# Patient Record
Sex: MALE | Race: WHITE | NOT HISPANIC OR LATINO | Employment: OTHER | RURAL
[De-identification: names, ages, dates, MRNs, and addresses within clinical notes are randomized per-mention and may not be internally consistent; named-entity substitution may affect disease eponyms.]

---

## 2017-02-24 DIAGNOSIS — I42.0 DILATED CARDIOMYOPATHY (HCC): ICD-10-CM

## 2017-03-01 DIAGNOSIS — I42.0 DILATED CARDIOMYOPATHY (HCC): ICD-10-CM

## 2017-03-01 RX ORDER — CARVEDILOL 25 MG/1
25 TABLET ORAL 2 TIMES DAILY
Qty: 60 TAB | Refills: 6 | Status: CANCELLED | OUTPATIENT
Start: 2017-03-01

## 2017-03-03 DIAGNOSIS — I42.0 DILATED CARDIOMYOPATHY (HCC): ICD-10-CM

## 2017-03-03 RX ORDER — CARVEDILOL 25 MG/1
25 TABLET ORAL 2 TIMES DAILY
Qty: 60 TAB | Refills: 11 | Status: SHIPPED | OUTPATIENT
Start: 2017-03-03 | End: 2018-03-15 | Stop reason: SDUPTHER

## 2017-03-07 DIAGNOSIS — I42.0 DILATED CARDIOMYOPATHY (HCC): ICD-10-CM

## 2017-03-07 RX ORDER — FUROSEMIDE 40 MG/1
40 TABLET ORAL DAILY
Qty: 90 TAB | Refills: 3 | Status: SHIPPED | OUTPATIENT
Start: 2017-03-07 | End: 2018-03-15 | Stop reason: SDUPTHER

## 2017-04-04 ENCOUNTER — NON-PROVIDER VISIT (OUTPATIENT)
Dept: CARDIOLOGY | Facility: CLINIC | Age: 57
End: 2017-04-04
Payer: MEDICARE

## 2017-04-04 DIAGNOSIS — I10 ESSENTIAL HYPERTENSION: ICD-10-CM

## 2017-04-05 LAB
LV EJECT FRACT  99904: 50
LV EJECT FRACT MOD 2C 99903: 47.82
LV EJECT FRACT MOD 4C 99902: 48.73
LV EJECT FRACT MOD BP 99901: 54.73

## 2017-04-06 DIAGNOSIS — I42.0 DILATED CARDIOMYOPATHY (HCC): ICD-10-CM

## 2017-04-07 ENCOUNTER — TELEPHONE (OUTPATIENT)
Dept: CARDIOLOGY | Facility: MEDICAL CENTER | Age: 57
End: 2017-04-07

## 2017-04-07 NOTE — TELEPHONE ENCOUNTER
Echo read by Dr. Zacarias.  Patient's f/u visit in June:    CONCLUSIONS  Mildly reduced left ventricular systolic function.  Left ventricular ejection fraction is visually estimated to be 50%.  Mid and basal septal hypokinesis.  Grade I diastolic dysfunction.  Normal right ventricular size and systolic function.  No significant valve disease or flow abnormalities.   Compared to the images of the prior study done 4/5/2016, there has been   an improvement in the estimated ejection fraction previously 40%.

## 2017-06-20 ENCOUNTER — OFFICE VISIT (OUTPATIENT)
Dept: CARDIOLOGY | Facility: CLINIC | Age: 57
End: 2017-06-20
Payer: MEDICARE

## 2017-06-20 VITALS
SYSTOLIC BLOOD PRESSURE: 140 MMHG | BODY MASS INDEX: 31.23 KG/M2 | HEIGHT: 67 IN | HEART RATE: 70 BPM | DIASTOLIC BLOOD PRESSURE: 80 MMHG | WEIGHT: 199 LBS

## 2017-06-20 DIAGNOSIS — I25.10 ATHEROSCLEROSIS OF NATIVE CORONARY ARTERY OF NATIVE HEART WITHOUT ANGINA PECTORIS: ICD-10-CM

## 2017-06-20 DIAGNOSIS — I42.0 DILATED CARDIOMYOPATHY (HCC): ICD-10-CM

## 2017-06-20 DIAGNOSIS — I10 ESSENTIAL HYPERTENSION, BENIGN: ICD-10-CM

## 2017-06-20 PROCEDURE — 99213 OFFICE O/P EST LOW 20 MIN: CPT | Performed by: INTERNAL MEDICINE

## 2017-06-20 ASSESSMENT — ENCOUNTER SYMPTOMS
COUGH: 0
ORTHOPNEA: 0
PND: 0
FALLS: 0
BRUISES/BLEEDS EASILY: 0
BACK PAIN: 1
WHEEZING: 0

## 2017-06-20 NOTE — PROGRESS NOTES
Subjective:   Everardo Last is a 57 y.o. male who presents today for follow-up of his coronary disease and left ventricular dysfunction  No angina, palpitation or dyspnea.   No syncope nor near syncope.   Past Medical History   Diagnosis Date   • Dilated cardiomyopathy (CMS-[facility]) April 2015     Echocardiogram with severely dilated LV. LVEF 20-25%. Global hypokinesis. Mild MR.  Coronary angiogram with 40% stenosis of LAD. Echo 8/4/2015 EF 35-45%, Echo 4/4/2017 EF 50%    • Hypertension    • Diabetes mellitus (CMS-[facility])    • Back pain    • Anxiety    • Depression      Past Surgical History   Procedure Laterality Date   • Eye surgery     • Other orthopedic surgery       Left ankle surgery   • Appendectomy       Family History   Problem Relation Age of Onset   • Heart Attack Mother    • Heart Disease Father    • Stroke Father      History   Smoking status   • Former Smoker   • Types: Cigarettes   • Quit date: 04/29/1994   Smokeless tobacco   • Never Used     Allergies   Allergen Reactions   • Codeine Rash     Patient states he gets rash on his arm     • Keflex Itching     Patient states he begins to itch all over      Outpatient Encounter Prescriptions as of 6/20/2017   Medication Sig Dispense Refill   • furosemide (LASIX) 40 MG Tab Take 1 Tab by mouth every day. 90 Tab 3   • carvedilol (COREG) 25 MG Tab Take 1 Tab by mouth 2 Times a Day. 60 Tab 11   • hydrocodone-acetaminophen (NORCO) 5-325 MG TABS per tablet Take 1-2 Tabs by mouth 1 time daily as needed.     • lisinopril (PRINIVIL) 10 MG TABS Take 1 Tab by mouth 2 Times a Day. 60 Tab 6   • spironolactone (ALDACTONE) 25 MG TABS Take 1 Tab by mouth every day. 30 Tab 6   • glipiZIDE (GLUCOTROL) 5 MG TABS Take 1 Tab by mouth every day. 30 Tab 6   • ferrous gluconate (FERGON) 324 (38 FE) MG TABS Take 1 Tab by mouth every morning with breakfast. 30 Tab 6   • aspirin (ASA) 81 MG CHEW chewable tablet Take 81 mg by mouth every day.       No facility-administered encounter  "medications on file as of 6/20/2017.     Review of Systems   Respiratory: Negative for cough and wheezing.    Cardiovascular: Negative for orthopnea, leg swelling and PND.   Musculoskeletal: Positive for back pain. Negative for falls.   Endo/Heme/Allergies: Does not bruise/bleed easily.        Objective:   /80 mmHg  Pulse 70  Ht 1.702 m (5' 7\")  Wt 90.266 kg (199 lb)  BMI 31.16 kg/m2    Physical Exam   Constitutional: He is oriented to person, place, and time. He appears well-developed and well-nourished. No distress.   Eyes: Conjunctivae are normal.   Neck: No JVD present.   Cardiovascular: Normal rate, regular rhythm, normal heart sounds and intact distal pulses.  Exam reveals no gallop and no friction rub.    No murmur heard.  Pulmonary/Chest: Effort normal and breath sounds normal.   Musculoskeletal: He exhibits no edema.   Neurological: He is alert and oriented to person, place, and time.   Skin: Skin is warm and dry. He is not diaphoretic.   Psychiatric: He has a normal mood and affect.       Assessment:     1. Atherosclerosis of native coronary artery of native heart without angina pectoris     2. Dilated cardiomyopathy (CMS-HCC)     3. Essential hypertension, benign       The above assessed cardiovascular problems are clinically stable.    Medical Decision Making:  Today's Assessment / Status / Plan:     Continue the current cardiovascular regimen.  Continue primary follow up with  Angelica Kay.   Cardiology follow up in  6 months and  sooner if needed for any change.   Lab before next month and call  (see scanned requisition).  Use of the emergency medical system reviewed.     "

## 2017-09-19 ENCOUNTER — OFFICE VISIT (OUTPATIENT)
Dept: CARDIOLOGY | Facility: CLINIC | Age: 57
End: 2017-09-19
Payer: MEDICARE

## 2017-09-19 VITALS
DIASTOLIC BLOOD PRESSURE: 80 MMHG | HEART RATE: 70 BPM | HEIGHT: 67 IN | WEIGHT: 201 LBS | SYSTOLIC BLOOD PRESSURE: 130 MMHG | BODY MASS INDEX: 31.55 KG/M2

## 2017-09-19 DIAGNOSIS — I42.0 DILATED CARDIOMYOPATHY (HCC): ICD-10-CM

## 2017-09-19 DIAGNOSIS — I10 ESSENTIAL HYPERTENSION, BENIGN: ICD-10-CM

## 2017-09-19 DIAGNOSIS — I25.10 ATHEROSCLEROSIS OF NATIVE CORONARY ARTERY OF NATIVE HEART WITHOUT ANGINA PECTORIS: ICD-10-CM

## 2017-09-19 PROCEDURE — 99213 OFFICE O/P EST LOW 20 MIN: CPT | Performed by: INTERNAL MEDICINE

## 2017-09-19 ASSESSMENT — ENCOUNTER SYMPTOMS
MYALGIAS: 0
PND: 0
COUGH: 0
BACK PAIN: 1
FALLS: 0
WHEEZING: 0
ORTHOPNEA: 0

## 2017-09-19 NOTE — LETTER
[facility]   [address] [place] [address]  Phone: [phone]  Fax:                Everardo Last  1960    Encounter Date: 9/19/2017    Tyler Thapa M.D.          PROGRESS NOTE:  Subjective:   Everardo Last is a 57 y.o. male who presents today for follow-up of cardiomyopathy. He has done very well with adjustment of his medicines by Echo To and improved control of his blood pressure. He has had no congestive symptoms.  He has had no symptoms to suggest angina.    Past Medical History:   Diagnosis Date   • Dilated cardiomyopathy (CMS-HCC) April 2015    Echocardiogram with severely dilated LV. LVEF 20-25%. Global hypokinesis. Mild MR.  Coronary angiogram with 40% stenosis of LAD. Echo 8/4/2015 EF 35-45%, Echo 4/4/2017 EF 50%    • Anxiety    • Back pain    • Depression    • Diabetes mellitus (CMS-HCC)    • Hypertension      Past Surgical History:   Procedure Laterality Date   • APPENDECTOMY     • EYE SURGERY     • OTHER ORTHOPEDIC SURGERY      Left ankle surgery     Family History   Problem Relation Age of Onset   • Heart Attack Mother    • Heart Disease Father    • Stroke Father      History   Smoking Status   • Former Smoker   • Types: Cigarettes   • Quit date: 4/29/1994   Smokeless Tobacco   • Never Used     Allergies   Allergen Reactions   • Codeine Rash     Patient states he gets rash on his arm     • Keflex Itching     Patient states he begins to itch all over      Outpatient Encounter Prescriptions as of 9/19/2017   Medication Sig Dispense Refill   • furosemide (LASIX) 40 MG Tab Take 1 Tab by mouth every day. 90 Tab 3   • carvedilol (COREG) 25 MG Tab Take 1 Tab by mouth 2 Times a Day. 60 Tab 11   • hydrocodone-acetaminophen (NORCO) 5-325 MG TABS per tablet Take 1-2 Tabs by mouth 1 time daily as needed.     • lisinopril (PRINIVIL) 10 MG TABS Take 1 Tab by mouth 2 Times a Day. 60 Tab 6   • spironolactone (ALDACTONE) 25  "MG TABS Take 1 Tab by mouth every day. 30 Tab 6   • glipiZIDE (GLUCOTROL) 5 MG TABS Take 1 Tab by mouth every day. 30 Tab 6   • ferrous gluconate (FERGON) 324 (38 FE) MG TABS Take 1 Tab by mouth every morning with breakfast. 30 Tab 6   • aspirin (ASA) 81 MG CHEW chewable tablet Take 81 mg by mouth every day.       No facility-administered encounter medications on file as of 9/19/2017.      Review of Systems   Respiratory: Negative for cough and wheezing.    Cardiovascular: Negative for orthopnea and PND.   Musculoskeletal: Positive for back pain. Negative for falls and myalgias.        Objective:   /80   Pulse 70   Ht 1.702 m (5' 7\")   Wt 91.2 kg (201 lb)   BMI 31.48 kg/m²      Physical Exam    Assessment:     1. Dilated cardiomyopathy (CMS-HCC)     2. Atherosclerosis of native coronary artery of native heart without angina pectoris     3. Essential hypertension, benign       Cardiac status has been clinically stable and his ejection fraction has substantially improved on medical therapy. His musculoskeletal back pain and presumed myalgias have been a barrier to statin therapy but his LDL has not been significantly elevated. He just had lab drawn yesterday and we are going to request that. It included chemistries and lipid panel.  Medical Decision Making:  Today's Assessment / Status / Plan:     Continue the current cardiovascular regimen.  Continue primary follow up with  Angelica Ariza.   Cardiology follow up in 6 months and  sooner if needed for any change.   Lab requested as noted above.  Use of the emergency medical system reviewed for any recurrent symptoms.       No Recipients              "

## 2017-09-19 NOTE — PROGRESS NOTES
Subjective:   Everardo Last is a 57 y.o. male who presents today for follow-up of cardiomyopathy. He has done very well with adjustment of his medicines by Angelica Ariza and improved control of his blood pressure. He has had no congestive symptoms.  He has had no symptoms to suggest angina.    Past Medical History:   Diagnosis Date   • Dilated cardiomyopathy (CMS-HCC) April 2015    Echocardiogram with severely dilated LV. LVEF 20-25%. Global hypokinesis. Mild MR.  Coronary angiogram with 40% stenosis of LAD. Echo 8/4/2015 EF 35-45%, Echo 4/4/2017 EF 50%    • Anxiety    • Back pain    • Depression    • Diabetes mellitus (CMS-HCC)    • Hypertension      Past Surgical History:   Procedure Laterality Date   • APPENDECTOMY     • EYE SURGERY     • OTHER ORTHOPEDIC SURGERY      Left ankle surgery     Family History   Problem Relation Age of Onset   • Heart Attack Mother    • Heart Disease Father    • Stroke Father      History   Smoking Status   • Former Smoker   • Types: Cigarettes   • Quit date: 4/29/1994   Smokeless Tobacco   • Never Used     Allergies   Allergen Reactions   • Codeine Rash     Patient states he gets rash on his arm     • Keflex Itching     Patient states he begins to itch all over      Outpatient Encounter Prescriptions as of 9/19/2017   Medication Sig Dispense Refill   • furosemide (LASIX) 40 MG Tab Take 1 Tab by mouth every day. 90 Tab 3   • carvedilol (COREG) 25 MG Tab Take 1 Tab by mouth 2 Times a Day. 60 Tab 11   • hydrocodone-acetaminophen (NORCO) 5-325 MG TABS per tablet Take 1-2 Tabs by mouth 1 time daily as needed.     • lisinopril (PRINIVIL) 10 MG TABS Take 1 Tab by mouth 2 Times a Day. 60 Tab 6   • spironolactone (ALDACTONE) 25 MG TABS Take 1 Tab by mouth every day. 30 Tab 6   • glipiZIDE (GLUCOTROL) 5 MG TABS Take 1 Tab by mouth every day. 30 Tab 6   • ferrous gluconate (FERGON) 324 (38 FE) MG TABS Take 1 Tab by mouth every morning with breakfast. 30 Tab 6   • aspirin (ASA) 81 MG CHEW  "chewable tablet Take 81 mg by mouth every day.       No facility-administered encounter medications on file as of 9/19/2017.      Review of Systems   Respiratory: Negative for cough and wheezing.    Cardiovascular: Negative for orthopnea and PND.   Musculoskeletal: Positive for back pain. Negative for falls and myalgias.        Objective:   /80   Pulse 70   Ht 1.702 m (5' 7\")   Wt 91.2 kg (201 lb)   BMI 31.48 kg/m²     Physical Exam    Assessment:     1. Dilated cardiomyopathy (CMS-HCC)     2. Atherosclerosis of native coronary artery of native heart without angina pectoris     3. Essential hypertension, benign       Cardiac status has been clinically stable and his ejection fraction has substantially improved on medical therapy. His musculoskeletal back pain and presumed myalgias have been a barrier to statin therapy but his LDL has not been significantly elevated. He just had lab drawn yesterday and we are going to request that. It included chemistries and lipid panel.  Medical Decision Making:  Today's Assessment / Status / Plan:     Continue the current cardiovascular regimen.  Continue primary follow up with  Angelica Ariza.   Cardiology follow up in 6 months and  sooner if needed for any change.   Lab requested as noted above.  Use of the emergency medical system reviewed for any recurrent symptoms.   "

## 2017-09-20 DIAGNOSIS — I42.0 DILATED CARDIOMYOPATHY (HCC): ICD-10-CM

## 2017-09-20 DIAGNOSIS — E78.5 OTHER AND UNSPECIFIED HYPERLIPIDEMIA: ICD-10-CM

## 2018-03-15 ENCOUNTER — OFFICE VISIT (OUTPATIENT)
Dept: CARDIOLOGY | Facility: MEDICAL CENTER | Age: 58
End: 2018-03-15
Payer: MEDICARE

## 2018-03-15 VITALS
HEIGHT: 67 IN | DIASTOLIC BLOOD PRESSURE: 102 MMHG | WEIGHT: 197 LBS | BODY MASS INDEX: 30.92 KG/M2 | OXYGEN SATURATION: 94 % | SYSTOLIC BLOOD PRESSURE: 162 MMHG | HEART RATE: 72 BPM

## 2018-03-15 DIAGNOSIS — I10 ESSENTIAL HYPERTENSION, BENIGN: ICD-10-CM

## 2018-03-15 DIAGNOSIS — F41.9 ANXIETY: ICD-10-CM

## 2018-03-15 DIAGNOSIS — E11.9 TYPE 2 DIABETES MELLITUS WITHOUT COMPLICATION, WITHOUT LONG-TERM CURRENT USE OF INSULIN (HCC): ICD-10-CM

## 2018-03-15 DIAGNOSIS — E55.9 VITAMIN D DEFICIENCY: ICD-10-CM

## 2018-03-15 DIAGNOSIS — E78.2 MIXED HYPERLIPIDEMIA: ICD-10-CM

## 2018-03-15 DIAGNOSIS — I42.0 DILATED CARDIOMYOPATHY (HCC): Primary | ICD-10-CM

## 2018-03-15 PROCEDURE — 99214 OFFICE O/P EST MOD 30 MIN: CPT | Performed by: NURSE PRACTITIONER

## 2018-03-15 RX ORDER — FUROSEMIDE 40 MG/1
40 TABLET ORAL DAILY
Qty: 90 TAB | Refills: 3 | Status: SHIPPED | OUTPATIENT
Start: 2018-03-15 | End: 2019-05-30 | Stop reason: SDUPTHER

## 2018-03-15 RX ORDER — LISINOPRIL 10 MG/1
10 TABLET ORAL 2 TIMES DAILY
Qty: 180 TAB | Refills: 3 | Status: ON HOLD | OUTPATIENT
Start: 2018-03-15 | End: 2023-05-12

## 2018-03-15 RX ORDER — CARVEDILOL 25 MG/1
25 TABLET ORAL 2 TIMES DAILY
Qty: 180 TAB | Refills: 3 | Status: SHIPPED | OUTPATIENT
Start: 2018-03-15

## 2018-03-15 RX ORDER — SPIRONOLACTONE 25 MG/1
25 TABLET ORAL DAILY
Qty: 90 TAB | Refills: 3 | Status: ON HOLD | OUTPATIENT
Start: 2018-03-15 | End: 2023-05-12

## 2018-03-15 ASSESSMENT — ENCOUNTER SYMPTOMS
SHORTNESS OF BREATH: 0
ORTHOPNEA: 0
NERVOUS/ANXIOUS: 1
MYALGIAS: 0
WEAKNESS: 0
ABDOMINAL PAIN: 0
CLAUDICATION: 0
PND: 0
COUGH: 0
PALPITATIONS: 1
DIZZINESS: 0

## 2018-03-15 NOTE — LETTER
"     [facility]   [address]  [place] [address]  Phone: [phone]  Fax: [phone]              Everardo Last  1960    Encounter Date: 3/15/2018    FRANK Leon          PROGRESS NOTE:  Subjective:   Everardo \"Adiel\"  Berhane is a 57 y.o. male who presents today to follow up on dilated cardiomyopathy and hypertension. He was last seen by Dr Thapa in September.    He has history of dilated cardiomyopathy diagnosed in 2015 where his ejection fraction was 15%. His ejection fraction has improved and his last echocardiogram done in April 2017 showed an ejection fraction 50%.    He states he feels generally well with the exception of some anxiety over family situations. He feels so he is having anxiety attacks.    He denies any chest tightness, heaviness or pressure. No orthopnea or PND. No ankle edema.    His blood pressure is elevated since he was low on his carvedilol therefore he was cutting the tablets in half. He's been checking his blood pressure with his ex-wife's blood pressure cuff and it has been running in the 150s over 70s prior to reducing his carvedilol dose.    Occasionally he will feel palpitations particularly at night where he will wake up with his heart pounding. He does not have any known sleep apnea.        Past Medical History:   Diagnosis Date   • Anxiety    • Back pain    • Depression    • Diabetes mellitus (CMS-HCC)    • Dilated cardiomyopathy (CMS-HCC) April 2015    Echocardiogram with severely dilated LV. LVEF 20-25%. Global hypokinesis. Mild MR.  Coronary angiogram with 40% stenosis of LAD. Echo 8/4/2015 EF 35-45%, Echo 4/4/2017 EF 50%    • Hypertension      Past Surgical History:   Procedure Laterality Date   • CARDIAC CATH  04/15/2015    nonobstructive CAD, Dilated cardiomyopathy EF 15%.   • APPENDECTOMY     • EYE SURGERY     • OTHER ORTHOPEDIC SURGERY      Left ankle surgery     Family History   Problem " Relation Age of Onset   • Heart Attack Mother    • Heart Disease Father    • Stroke Father      History   Smoking Status   • Former Smoker   • Types: Cigarettes   • Quit date: 4/29/1994   Smokeless Tobacco   • Never Used     Allergies   Allergen Reactions   • Codeine Rash     Patient states he gets rash on his arm     • Keflex Itching     Patient states he begins to itch all over    • Statins [Hmg-Coa-R Inhibitors] Myalgia     Outpatient Encounter Prescriptions as of 3/15/2018   Medication Sig Dispense Refill   • furosemide (LASIX) 40 MG Tab Take 1 Tab by mouth every day. 90 Tab 3   • carvedilol (COREG) 25 MG Tab Take 1 Tab by mouth 2 Times a Day. 180 Tab 3   • lisinopril (PRINIVIL) 10 MG Tab Take 1 Tab by mouth 2 Times a Day. 180 Tab 3   • spironolactone (ALDACTONE) 25 MG Tab Take 1 Tab by mouth every day. 90 Tab 3   • glipiZIDE (GLUCOTROL) 5 MG TABS Take 1 Tab by mouth every day. 30 Tab 6   • aspirin (ASA) 81 MG CHEW chewable tablet Take 81 mg by mouth every day.     • [DISCONTINUED] furosemide (LASIX) 40 MG Tab Take 1 Tab by mouth every day. 90 Tab 3   • [DISCONTINUED] carvedilol (COREG) 25 MG Tab Take 1 Tab by mouth 2 Times a Day. 60 Tab 11   • hydrocodone-acetaminophen (NORCO) 5-325 MG TABS per tablet Take 1-2 Tabs by mouth 1 time daily as needed.     • [DISCONTINUED] lisinopril (PRINIVIL) 10 MG TABS Take 1 Tab by mouth 2 Times a Day. 60 Tab 6   • [DISCONTINUED] spironolactone (ALDACTONE) 25 MG TABS Take 1 Tab by mouth every day. 30 Tab 6   • [DISCONTINUED] ferrous gluconate (FERGON) 324 (38 FE) MG TABS Take 1 Tab by mouth every morning with breakfast. 30 Tab 6     No facility-administered encounter medications on file as of 3/15/2018.      Review of Systems   Constitutional: Negative for malaise/fatigue.   Respiratory: Negative for cough and shortness of breath.    Cardiovascular: Positive for palpitations (awoke with fast heart beat, no chest pain.). Negative for chest pain, orthopnea, claudication, leg  "swelling and PND.   Gastrointestinal: Negative for abdominal pain.   Musculoskeletal: Negative for myalgias.   Neurological: Negative for dizziness and weakness.   Psychiatric/Behavioral: The patient is nervous/anxious.         Objective:   BP (!) 162/102   Pulse 72   Ht 1.702 m (5' 7\")   Wt 89.4 kg (197 lb)   SpO2 94%   BMI 30.85 kg/m²      Physical Exam   Constitutional: He is oriented to person, place, and time. He appears well-developed and well-nourished.   HENT:   Head: Normocephalic.   Eyes: Conjunctivae are normal.   Neck: No JVD present. No thyromegaly present.   Cardiovascular: Normal rate, regular rhythm and normal heart sounds.    Pulmonary/Chest: Effort normal and breath sounds normal. He has no wheezes. He has no rales.   Abdominal: Soft. Bowel sounds are normal. He exhibits no distension. There is no tenderness.   Musculoskeletal: He exhibits no edema.   Neurological: He is alert and oriented to person, place, and time.   Skin: Skin is warm and dry.   Psychiatric: He has a normal mood and affect.     April 4, 2017: Transthoracic Echo Report  Mildly reduced left ventricular systolic function.  Left ventricular ejection fraction is visually estimated to be 50%.  Mid and basal septal hypokinesis.  Grade I diastolic dysfunction.  Normal right ventricular size and systolic function.  No significant valve disease or flow abnormalities.   Compared to the images of the prior study done 4/5/2016, there has been an improvement in the estimated ejection fraction previously 40%.    February 7, 2018: CBC is within normal limits. Comprehensive metabolic panels within normal limits with exception of glucose 186. Lipids: Cholesterol 156, triglycerides 175, HDL 35, LDL 86. Uric acid 7.1, hemoglobin A1c 7.4, vitamin D 19.    Assessment:     1. Dilated cardiomyopathy (CMS-HCC)  furosemide (LASIX) 40 MG Tab    carvedilol (COREG) 25 MG Tab    lisinopril (PRINIVIL) 10 MG Tab    spironolactone (ALDACTONE) 25 MG Tab   "   2. Essential hypertension, benign     3. Mixed hyperlipidemia  COMP METABOLIC PANEL    LIPID PROFILE   4. Type 2 diabetes mellitus without complication, without long-term current use of insulin (CMS-[facility])     5. Vitamin D deficiency     6. Anxiety         Medical Decision Making:  Today's Assessment / Status / Plan:     Dilated cardiomyopathy: His ejection fraction when last measured was 50%. I've encouraged and stay on his medications. He has no fluid overload signs.    Hypertension: His blood pressure is very elevated in the office today. He has been cutting his carvedilol tablets in half and only taking 12.5 in order to make them last longer since he needed a refill from the pharmacy. We will have him return to carvedilol 25 mg twice a day along with his lisinopril. Would like him to monitor blood pressure morning and evening and follow-up with his primary care in 2-3 weeks. His blood pressure goal is 1:30 systolic or less in order to take the strain off his heart.    Hyperlipidemia: Last lipids were acceptable. We will check lipids and metabolic panel prior to his next visit.    Diabetes: Moderate control. He will follow with primary care.    Vitamin D deficiency: His vitamin D level in February was 19. Normal range is  with preferable around 60. Likelihood is the patient will not replete his vitamin D by taking over-the-counter supplements as they are not potent enough. He probably will need vitamin D replacement with 50,000 units a week for 6-12 weeks followed by maintenance therapy. I will leave this up to his primary care to treat his vitamin D deficiency.    Anxiety: Related to family matters. Low vitamin D can also cause depression and anxiety as the vitamin D is part of the serotonin system. I've encouraged patient to walk daily in order to help manage his anxiety and his cardiomyopathy.    He is stable enough from a cardiac standpoint to follow up in 6 months. He lives in [place] and finds it  difficult to get into [place]. Possibly he may be able to be seen in [place]. He will follow-up with his primary care and a couple of weeks to manage his blood pressure.    Collaborating Provider: Dr. Noland.        No Recipients

## 2018-03-15 NOTE — PROGRESS NOTES
"Subjective:   Everardo \"Aidel\"  Berhane is a 57 y.o. male who presents today to follow up on dilated cardiomyopathy and hypertension. He was last seen by Dr Thapa in September.    He has history of dilated cardiomyopathy diagnosed in 2015 where his ejection fraction was 15%. His ejection fraction has improved and his last echocardiogram done in April 2017 showed an ejection fraction 50%.    He states he feels generally well with the exception of some anxiety over family situations. He feels so he is having anxiety attacks.    He denies any chest tightness, heaviness or pressure. No orthopnea or PND. No ankle edema.    His blood pressure is elevated since he was low on his carvedilol therefore he was cutting the tablets in half. He's been checking his blood pressure with his ex-wife's blood pressure cuff and it has been running in the 150s over 70s prior to reducing his carvedilol dose.    Occasionally he will feel palpitations particularly at night where he will wake up with his heart pounding. He does not have any known sleep apnea.        Past Medical History:   Diagnosis Date   • Anxiety    • Back pain    • Depression    • Diabetes mellitus (CMS-HCC)    • Dilated cardiomyopathy (CMS-HCC) April 2015    Echocardiogram with severely dilated LV. LVEF 20-25%. Global hypokinesis. Mild MR.  Coronary angiogram with 40% stenosis of LAD. Echo 8/4/2015 EF 35-45%, Echo 4/4/2017 EF 50%    • Hypertension      Past Surgical History:   Procedure Laterality Date   • CARDIAC CATH  04/15/2015    nonobstructive CAD, Dilated cardiomyopathy EF 15%.   • APPENDECTOMY     • EYE SURGERY     • OTHER ORTHOPEDIC SURGERY      Left ankle surgery     Family History   Problem Relation Age of Onset   • Heart Attack Mother    • Heart Disease Father    • Stroke Father      History   Smoking Status   • Former Smoker   • Types: Cigarettes   • Quit date: 4/29/1994   Smokeless Tobacco   • Never Used     Allergies   Allergen Reactions   • Codeine " Rash     Patient states he gets rash on his arm     • Keflex Itching     Patient states he begins to itch all over    • Statins [Hmg-Coa-R Inhibitors] Myalgia     Outpatient Encounter Prescriptions as of 3/15/2018   Medication Sig Dispense Refill   • furosemide (LASIX) 40 MG Tab Take 1 Tab by mouth every day. 90 Tab 3   • carvedilol (COREG) 25 MG Tab Take 1 Tab by mouth 2 Times a Day. 180 Tab 3   • lisinopril (PRINIVIL) 10 MG Tab Take 1 Tab by mouth 2 Times a Day. 180 Tab 3   • spironolactone (ALDACTONE) 25 MG Tab Take 1 Tab by mouth every day. 90 Tab 3   • glipiZIDE (GLUCOTROL) 5 MG TABS Take 1 Tab by mouth every day. 30 Tab 6   • aspirin (ASA) 81 MG CHEW chewable tablet Take 81 mg by mouth every day.     • [DISCONTINUED] furosemide (LASIX) 40 MG Tab Take 1 Tab by mouth every day. 90 Tab 3   • [DISCONTINUED] carvedilol (COREG) 25 MG Tab Take 1 Tab by mouth 2 Times a Day. 60 Tab 11   • hydrocodone-acetaminophen (NORCO) 5-325 MG TABS per tablet Take 1-2 Tabs by mouth 1 time daily as needed.     • [DISCONTINUED] lisinopril (PRINIVIL) 10 MG TABS Take 1 Tab by mouth 2 Times a Day. 60 Tab 6   • [DISCONTINUED] spironolactone (ALDACTONE) 25 MG TABS Take 1 Tab by mouth every day. 30 Tab 6   • [DISCONTINUED] ferrous gluconate (FERGON) 324 (38 FE) MG TABS Take 1 Tab by mouth every morning with breakfast. 30 Tab 6     No facility-administered encounter medications on file as of 3/15/2018.      Review of Systems   Constitutional: Negative for malaise/fatigue.   Respiratory: Negative for cough and shortness of breath.    Cardiovascular: Positive for palpitations (awoke with fast heart beat, no chest pain.). Negative for chest pain, orthopnea, claudication, leg swelling and PND.   Gastrointestinal: Negative for abdominal pain.   Musculoskeletal: Negative for myalgias.   Neurological: Negative for dizziness and weakness.   Psychiatric/Behavioral: The patient is nervous/anxious.         Objective:   BP (!) 162/102   Pulse 72   Ht  "1.702 m (5' 7\")   Wt 89.4 kg (197 lb)   SpO2 94%   BMI 30.85 kg/m²     Physical Exam   Constitutional: He is oriented to person, place, and time. He appears well-developed and well-nourished.   HENT:   Head: Normocephalic.   Eyes: Conjunctivae are normal.   Neck: No JVD present. No thyromegaly present.   Cardiovascular: Normal rate, regular rhythm and normal heart sounds.    Pulmonary/Chest: Effort normal and breath sounds normal. He has no wheezes. He has no rales.   Abdominal: Soft. Bowel sounds are normal. He exhibits no distension. There is no tenderness.   Musculoskeletal: He exhibits no edema.   Neurological: He is alert and oriented to person, place, and time.   Skin: Skin is warm and dry.   Psychiatric: He has a normal mood and affect.     April 4, 2017: Transthoracic Echo Report  Mildly reduced left ventricular systolic function.  Left ventricular ejection fraction is visually estimated to be 50%.  Mid and basal septal hypokinesis.  Grade I diastolic dysfunction.  Normal right ventricular size and systolic function.  No significant valve disease or flow abnormalities.   Compared to the images of the prior study done 4/5/2016, there has been an improvement in the estimated ejection fraction previously 40%.    February 7, 2018: CBC is within normal limits. Comprehensive metabolic panels within normal limits with exception of glucose 186. Lipids: Cholesterol 156, triglycerides 175, HDL 35, LDL 86. Uric acid 7.1, hemoglobin A1c 7.4, vitamin D 19.    Assessment:     1. Dilated cardiomyopathy (CMS-HCC)  furosemide (LASIX) 40 MG Tab    carvedilol (COREG) 25 MG Tab    lisinopril (PRINIVIL) 10 MG Tab    spironolactone (ALDACTONE) 25 MG Tab   2. Essential hypertension, benign     3. Mixed hyperlipidemia  COMP METABOLIC PANEL    LIPID PROFILE   4. Type 2 diabetes mellitus without complication, without long-term current use of insulin (CMS-[facility])     5. Vitamin D deficiency     6. Anxiety         Medical Decision " Making:  Today's Assessment / Status / Plan:     Dilated cardiomyopathy: His ejection fraction when last measured was 50%. I've encouraged and stay on his medications. He has no fluid overload signs.    Hypertension: His blood pressure is very elevated in the office today. He has been cutting his carvedilol tablets in half and only taking 12.5 in order to make them last longer since he needed a refill from the pharmacy. We will have him return to carvedilol 25 mg twice a day along with his lisinopril. Would like him to monitor blood pressure morning and evening and follow-up with his primary care in 2-3 weeks. His blood pressure goal is 1:30 systolic or less in order to take the strain off his heart.    Hyperlipidemia: Last lipids were acceptable. We will check lipids and metabolic panel prior to his next visit.    Diabetes: Moderate control. He will follow with primary care.    Vitamin D deficiency: His vitamin D level in February was 19. Normal range is  with preferable around 60. Likelihood is the patient will not replete his vitamin D by taking over-the-counter supplements as they are not potent enough. He probably will need vitamin D replacement with 50,000 units a week for 6-12 weeks followed by maintenance therapy. I will leave this up to his primary care to treat his vitamin D deficiency.    Anxiety: Related to family matters. Low vitamin D can also cause depression and anxiety as the vitamin D is part of the serotonin system. I've encouraged patient to walk daily in order to help manage his anxiety and his cardiomyopathy.    He is stable enough from a cardiac standpoint to follow up in 6 months. He lives in [place] and finds it difficult to get into [place]. Possibly he may be able to be seen in [place]. He will follow-up with his primary care and a couple of weeks to manage his blood pressure.    Collaborating Provider: Dr. Noland.

## 2018-03-27 DIAGNOSIS — E78.2 MIXED HYPERLIPIDEMIA: ICD-10-CM

## 2018-07-10 DIAGNOSIS — I42.0 DILATED CARDIOMYOPATHY (HCC): ICD-10-CM

## 2018-07-20 ENCOUNTER — TELEPHONE (OUTPATIENT)
Dept: CARDIOLOGY | Facility: MEDICAL CENTER | Age: 58
End: 2018-07-20

## 2018-08-10 ENCOUNTER — TELEMEDICINE2 (OUTPATIENT)
Dept: CARDIOLOGY | Facility: MEDICAL CENTER | Age: 58
End: 2018-08-10
Payer: MEDICARE

## 2018-08-10 VITALS
HEART RATE: 64 BPM | DIASTOLIC BLOOD PRESSURE: 100 MMHG | BODY MASS INDEX: 30.61 KG/M2 | OXYGEN SATURATION: 94 % | WEIGHT: 195 LBS | SYSTOLIC BLOOD PRESSURE: 148 MMHG | HEIGHT: 67 IN

## 2018-08-10 DIAGNOSIS — E78.2 MIXED HYPERLIPIDEMIA: ICD-10-CM

## 2018-08-10 DIAGNOSIS — I42.0 DILATED CARDIOMYOPATHY (HCC): ICD-10-CM

## 2018-08-10 DIAGNOSIS — I10 ESSENTIAL HYPERTENSION, BENIGN: ICD-10-CM

## 2018-08-10 DIAGNOSIS — I50.42 CHRONIC COMBINED SYSTOLIC AND DIASTOLIC HEART FAILURE (HCC): ICD-10-CM

## 2018-08-10 PROCEDURE — 99214 OFFICE O/P EST MOD 30 MIN: CPT | Performed by: INTERNAL MEDICINE

## 2018-08-10 ASSESSMENT — ENCOUNTER SYMPTOMS
ABDOMINAL PAIN: 0
PALPITATIONS: 0
CHILLS: 0
WHEEZING: 0
EYE PAIN: 0
MYALGIAS: 0
SPEECH CHANGE: 0
HEMOPTYSIS: 0
SHORTNESS OF BREATH: 1
DEPRESSION: 0
VOMITING: 0
BRUISES/BLEEDS EASILY: 0
BLURRED VISION: 0
COUGH: 1
WEAKNESS: 0
NERVOUS/ANXIOUS: 0
LOSS OF CONSCIOUSNESS: 0
FEVER: 0
FOCAL WEAKNESS: 0
SENSORY CHANGE: 0
DIZZINESS: 0
NAUSEA: 0
EYE DISCHARGE: 0
ORTHOPNEA: 0

## 2018-08-10 NOTE — LETTER
[facility]   [address] [place] [address]  [place] [address]  Phone: [phone]  Fax: [phone]              Everardo Last  1960    Encounter Date: 8/10/2018    Kalyan Garcia M.D.          PROGRESS NOTE:  No notes on file      No Recipients

## 2018-08-10 NOTE — PROGRESS NOTES
Chief Complaint   Patient presents with   • HTN (Controlled)/CHF, dilated cardiomyopathy, non ischemic, probably post viral     follow up telemed       Subjective:   Everardo Last is a 58 y.o. male who presents today for f/u above issues    She was a former patient of Dr. Thapa    She was first diagnosed with cardiomyopathy in April 2015 when she presented with severe HF. EF at the time was 15%. Cardiac catheterization did not show any flow limiting coronary artery disease. She reportedly may have had some type of flue like illness prior to that. She was also just diagnosed with hypertension and diabetes mellitus shortly before that. She denies ETOH or drug use.    Over the year, her LV function has improved. Her most recent ECHO was in 4/2017 and EF was reportedly at 50%.    She is currently on lisinopril, carvedilol, spironolactone and furosemide.  Her lisinopril dose was recently increased from 10 to 20 mg BID due to HTN.  Her BP is still high and another medication was recently added by her PCP but she has not started and did not recall the name.  She denies any new cardiac symptoms. No palpitations, orthopnea, PND or edema.  FC II.    Just had labs for PCP yesterday, result pending    Past Medical History:   Diagnosis Date   • Anxiety    • Back pain    • Depression    • Diabetes mellitus (HCC)    • Dilated cardiomyopathy (HCC) April 2015    Echocardiogram with severely dilated LV. LVEF 20-25%. Global hypokinesis. Mild MR.  Coronary angiogram with 40% stenosis of LAD. Echo 8/4/2015 EF 35-45%, Echo 4/4/2017 EF 50%    • Hypertension      Past Surgical History:   Procedure Laterality Date   • CARDIAC CATH  04/15/2015    nonobstructive CAD, Dilated cardiomyopathy EF 15%.   • APPENDECTOMY     • EYE SURGERY     • OTHER ORTHOPEDIC SURGERY      Left ankle surgery     Family History   Problem Relation Age of Onset   • Heart Attack Mother    • Heart Disease Father    • Stroke Father      Social History     Social  History   • Marital status:      Spouse name: N/A   • Number of children: N/A   • Years of education: N/A     Occupational History   • Not on file.     Social History Main Topics   • Smoking status: Former Smoker     Types: Cigarettes     Quit date: 4/29/1994   • Smokeless tobacco: Never Used   • Alcohol use No      Comment: notes remote alcholol use   • Drug use: No      Comment: use greater than 20 years age, denies current use   • Sexual activity: Not on file      Comment: speed     Other Topics Concern   • Not on file     Social History Narrative   • No narrative on file     Allergies   Allergen Reactions   • Codeine Rash     Patient states he gets rash on his arm     • Keflex Itching     Patient states he begins to itch all over    • Statins [Hmg-Coa-R Inhibitors] Myalgia     Outpatient Encounter Prescriptions as of 8/10/2018   Medication Sig Dispense Refill   • furosemide (LASIX) 40 MG Tab Take 1 Tab by mouth every day. 90 Tab 3   • carvedilol (COREG) 25 MG Tab Take 1 Tab by mouth 2 Times a Day. 180 Tab 3   • lisinopril (PRINIVIL) 10 MG Tab Take 1 Tab by mouth 2 Times a Day. (Patient taking differently: Take 20 mg by mouth 2 Times a Day.) 180 Tab 3   • spironolactone (ALDACTONE) 25 MG Tab Take 1 Tab by mouth every day. 90 Tab 3   • glipiZIDE (GLUCOTROL) 5 MG TABS Take 1 Tab by mouth every day. 30 Tab 6   • aspirin (ASA) 81 MG CHEW chewable tablet Take 81 mg by mouth every day.     • hydrocodone-acetaminophen (NORCO) 5-325 MG TABS per tablet Take 1-2 Tabs by mouth 1 time daily as needed.       No facility-administered encounter medications on file as of 8/10/2018.      Review of Systems   Constitutional: Negative for chills, fever and malaise/fatigue.   HENT: Negative for congestion.    Eyes: Negative for blurred vision, pain and discharge.   Respiratory: Positive for cough and shortness of breath. Negative for hemoptysis and wheezing.    Cardiovascular: Negative for chest pain, palpitations, orthopnea  "and leg swelling.   Gastrointestinal: Negative for abdominal pain, nausea and vomiting.   Musculoskeletal: Negative for joint pain and myalgias.   Skin: Negative for itching and rash.   Neurological: Negative for dizziness, sensory change, speech change, focal weakness, loss of consciousness and weakness.   Endo/Heme/Allergies: Does not bruise/bleed easily.   Psychiatric/Behavioral: Negative for depression. The patient is not nervous/anxious.    All other systems reviewed and are negative.       Objective:   /100   Pulse 64   Ht 1.702 m (5' 7\")   Wt 88.5 kg (195 lb)   SpO2 94%   BMI 30.54 kg/m²     Physical Exam   Cardiovascular: Normal rate.    Pulmonary/Chest: Effort normal.   Musculoskeletal: Normal range of motion. He exhibits no edema.   Neurological: He is alert.   Psychiatric: He has a normal mood and affect. His behavior is normal.       Assessment:     1. Dilated cardiomyopathy (HCC)  ECHOCARDIOGRAM COMP W/O CONT   2. Essential hypertension, benign     3. Mixed hyperlipidemia     4. Chronic combined systolic and diastolic heart failure (HCC) FC II ECHOCARDIOGRAM COMP W/O CONT       Medical Decision Making:  Today's Assessment / Status / Plan:     The patient's above cardiovascular conditions are relatively stable.   Her BP is too high. Agree with add antihypertensive.   Will reassess cardiac function with echocardiography at he end of the year.  Will continue current cardiac medications and have the patient return for a followup in 6 months. Will be happy to see the patient sooner as needed.   Thank you for allowing me to participate in the caring of this patient.  "

## 2019-05-30 DIAGNOSIS — I42.0 DILATED CARDIOMYOPATHY (HCC): ICD-10-CM

## 2019-05-31 RX ORDER — FUROSEMIDE 40 MG/1
40 TABLET ORAL DAILY
Qty: 90 TAB | Refills: 0 | Status: ON HOLD | OUTPATIENT
Start: 2019-05-31 | End: 2023-05-16

## 2020-04-09 ENCOUNTER — TELEPHONE (OUTPATIENT)
Dept: CARDIOLOGY | Facility: MEDICAL CENTER | Age: 60
End: 2020-04-09

## 2020-04-09 DIAGNOSIS — I10 ESSENTIAL HYPERTENSION, BENIGN: ICD-10-CM

## 2020-04-09 NOTE — TELEPHONE ENCOUNTER
EKG Order for Dr. Garcia's Telemed clinic 4/20/2020   Received: Today Message Contents   Anna Spring R.N.     Please provide an EKG order for Dr. Garcia's above patient for clinic on 4/20/2020.  Thanks, Anna DINH ordered.

## 2020-06-05 DIAGNOSIS — I50.42 CHRONIC COMBINED SYSTOLIC AND DIASTOLIC HEART FAILURE (HCC): ICD-10-CM

## 2020-06-05 DIAGNOSIS — I42.0 DILATED CARDIOMYOPATHY (HCC): ICD-10-CM

## 2020-06-10 ENCOUNTER — PATIENT MESSAGE (OUTPATIENT)
Dept: HEALTH INFORMATION MANAGEMENT | Facility: OTHER | Age: 60
End: 2020-06-10

## 2023-05-01 DIAGNOSIS — R11.0 NAUSEA: Primary | ICD-10-CM

## 2023-05-01 LAB — EKG IMPRESSION: NORMAL

## 2023-05-12 ENCOUNTER — APPOINTMENT (OUTPATIENT)
Dept: RADIOLOGY | Facility: MEDICAL CENTER | Age: 63
DRG: 065 | End: 2023-05-12
Attending: INTERNAL MEDICINE
Payer: MEDICARE

## 2023-05-12 ENCOUNTER — HOSPITAL ENCOUNTER (OUTPATIENT)
Dept: RADIOLOGY | Facility: MEDICAL CENTER | Age: 63
End: 2023-05-12

## 2023-05-12 ENCOUNTER — HOSPITAL ENCOUNTER (INPATIENT)
Facility: MEDICAL CENTER | Age: 63
LOS: 4 days | DRG: 065 | End: 2023-05-16
Attending: INTERNAL MEDICINE | Admitting: INTERNAL MEDICINE
Payer: MEDICARE

## 2023-05-12 DIAGNOSIS — I42.0 DILATED CARDIOMYOPATHY (HCC): ICD-10-CM

## 2023-05-12 DIAGNOSIS — E11.9 TYPE 2 DIABETES MELLITUS WITHOUT COMPLICATION, WITHOUT LONG-TERM CURRENT USE OF INSULIN (HCC): ICD-10-CM

## 2023-05-12 DIAGNOSIS — H04.129 DRY EYE: ICD-10-CM

## 2023-05-12 DIAGNOSIS — I63.9 ACUTE CEREBROVASCULAR ACCIDENT (CVA) (HCC): ICD-10-CM

## 2023-05-12 PROBLEM — M25.551 PAIN OF RIGHT HIP: Status: ACTIVE | Noted: 2023-05-12

## 2023-05-12 LAB
ANION GAP SERPL CALC-SCNC: 16 MMOL/L (ref 7–16)
BASOPHILS # BLD AUTO: 0.5 % (ref 0–1.8)
BASOPHILS # BLD: 0.06 K/UL (ref 0–0.12)
BUN SERPL-MCNC: 14 MG/DL (ref 8–22)
CALCIUM SERPL-MCNC: 8.9 MG/DL (ref 8.5–10.5)
CHLORIDE SERPL-SCNC: 105 MMOL/L (ref 96–112)
CO2 SERPL-SCNC: 19 MMOL/L (ref 20–33)
CREAT SERPL-MCNC: 1 MG/DL (ref 0.5–1.4)
EKG IMPRESSION: NORMAL
EOSINOPHIL # BLD AUTO: 0.02 K/UL (ref 0–0.51)
EOSINOPHIL NFR BLD: 0.2 % (ref 0–6.9)
ERYTHROCYTE [DISTWIDTH] IN BLOOD BY AUTOMATED COUNT: 46.7 FL (ref 35.9–50)
EST. AVERAGE GLUCOSE BLD GHB EST-MCNC: 126 MG/DL
GFR SERPLBLD CREATININE-BSD FMLA CKD-EPI: 85 ML/MIN/1.73 M 2
GLUCOSE BLD STRIP.AUTO-MCNC: 142 MG/DL (ref 65–99)
GLUCOSE SERPL-MCNC: 225 MG/DL (ref 65–99)
HBA1C MFR BLD: 6 % (ref 4–5.6)
HCT VFR BLD AUTO: 40.5 % (ref 42–52)
HGB BLD-MCNC: 13.5 G/DL (ref 14–18)
IMM GRANULOCYTES # BLD AUTO: 0.06 K/UL (ref 0–0.11)
IMM GRANULOCYTES NFR BLD AUTO: 0.5 % (ref 0–0.9)
LYMPHOCYTES # BLD AUTO: 1.47 K/UL (ref 1–4.8)
LYMPHOCYTES NFR BLD: 11.8 % (ref 22–41)
MAGNESIUM SERPL-MCNC: 2 MG/DL (ref 1.5–2.5)
MCH RBC QN AUTO: 28.5 PG (ref 27–33)
MCHC RBC AUTO-ENTMCNC: 33.3 G/DL (ref 33.7–35.3)
MCV RBC AUTO: 85.6 FL (ref 81.4–97.8)
MONOCYTES # BLD AUTO: 0.57 K/UL (ref 0–0.85)
MONOCYTES NFR BLD AUTO: 4.6 % (ref 0–13.4)
NEUTROPHILS # BLD AUTO: 10.32 K/UL (ref 1.82–7.42)
NEUTROPHILS NFR BLD: 82.4 % (ref 44–72)
NRBC # BLD AUTO: 0 K/UL
NRBC BLD-RTO: 0 /100 WBC
PHOSPHATE SERPL-MCNC: 3.6 MG/DL (ref 2.5–4.5)
PLATELET # BLD AUTO: 207 K/UL (ref 164–446)
PMV BLD AUTO: 10 FL (ref 9–12.9)
POTASSIUM SERPL-SCNC: 4.1 MMOL/L (ref 3.6–5.5)
RBC # BLD AUTO: 4.73 M/UL (ref 4.7–6.1)
SODIUM SERPL-SCNC: 140 MMOL/L (ref 135–145)
TROPONIN T SERPL-MCNC: 44 NG/L (ref 6–19)
WBC # BLD AUTO: 12.5 K/UL (ref 4.8–10.8)

## 2023-05-12 PROCEDURE — 72170 X-RAY EXAM OF PELVIS: CPT

## 2023-05-12 PROCEDURE — 83735 ASSAY OF MAGNESIUM: CPT

## 2023-05-12 PROCEDURE — 82962 GLUCOSE BLOOD TEST: CPT

## 2023-05-12 PROCEDURE — 700102 HCHG RX REV CODE 250 W/ 637 OVERRIDE(OP): Performed by: INTERNAL MEDICINE

## 2023-05-12 PROCEDURE — 770022 HCHG ROOM/CARE - ICU (200)

## 2023-05-12 PROCEDURE — 70551 MRI BRAIN STEM W/O DYE: CPT

## 2023-05-12 PROCEDURE — 700111 HCHG RX REV CODE 636 W/ 250 OVERRIDE (IP): Performed by: INTERNAL MEDICINE

## 2023-05-12 PROCEDURE — 92610 EVALUATE SWALLOWING FUNCTION: CPT

## 2023-05-12 PROCEDURE — 99291 CRITICAL CARE FIRST HOUR: CPT | Performed by: INTERNAL MEDICINE

## 2023-05-12 PROCEDURE — 93005 ELECTROCARDIOGRAM TRACING: CPT | Performed by: INTERNAL MEDICINE

## 2023-05-12 PROCEDURE — 80048 BASIC METABOLIC PNL TOTAL CA: CPT

## 2023-05-12 PROCEDURE — 51798 US URINE CAPACITY MEASURE: CPT

## 2023-05-12 PROCEDURE — 700101 HCHG RX REV CODE 250: Performed by: INTERNAL MEDICINE

## 2023-05-12 PROCEDURE — 99222 1ST HOSP IP/OBS MODERATE 55: CPT | Performed by: PSYCHIATRY & NEUROLOGY

## 2023-05-12 PROCEDURE — 83036 HEMOGLOBIN GLYCOSYLATED A1C: CPT

## 2023-05-12 PROCEDURE — 84100 ASSAY OF PHOSPHORUS: CPT

## 2023-05-12 PROCEDURE — 700105 HCHG RX REV CODE 258: Performed by: INTERNAL MEDICINE

## 2023-05-12 PROCEDURE — 73552 X-RAY EXAM OF FEMUR 2/>: CPT | Mod: RT

## 2023-05-12 PROCEDURE — 84484 ASSAY OF TROPONIN QUANT: CPT

## 2023-05-12 PROCEDURE — 70450 CT HEAD/BRAIN W/O DYE: CPT

## 2023-05-12 PROCEDURE — 93010 ELECTROCARDIOGRAM REPORT: CPT | Performed by: INTERNAL MEDICINE

## 2023-05-12 PROCEDURE — 85025 COMPLETE CBC W/AUTO DIFF WBC: CPT

## 2023-05-12 RX ORDER — LISINOPRIL 20 MG/1
20 TABLET ORAL DAILY
Status: DISCONTINUED | OUTPATIENT
Start: 2023-05-12 | End: 2023-05-13

## 2023-05-12 RX ORDER — PROMETHAZINE HYDROCHLORIDE 25 MG/1
12.5-25 TABLET ORAL EVERY 4 HOURS PRN
Status: DISCONTINUED | OUTPATIENT
Start: 2023-05-12 | End: 2023-05-16 | Stop reason: HOSPADM

## 2023-05-12 RX ORDER — BISACODYL 10 MG
10 SUPPOSITORY, RECTAL RECTAL
Status: DISCONTINUED | OUTPATIENT
Start: 2023-05-12 | End: 2023-05-16 | Stop reason: HOSPADM

## 2023-05-12 RX ORDER — GLIPIZIDE 10 MG/1
10 TABLET ORAL 2 TIMES DAILY
Status: ON HOLD | COMMUNITY
Start: 2023-04-20 | End: 2023-05-16

## 2023-05-12 RX ORDER — LABETALOL HYDROCHLORIDE 5 MG/ML
10 INJECTION, SOLUTION INTRAVENOUS
Status: COMPLETED | OUTPATIENT
Start: 2023-05-12 | End: 2023-05-12

## 2023-05-12 RX ORDER — ACETAMINOPHEN 325 MG/1
650 TABLET ORAL EVERY 6 HOURS PRN
Status: DISCONTINUED | OUTPATIENT
Start: 2023-05-12 | End: 2023-05-16 | Stop reason: HOSPADM

## 2023-05-12 RX ORDER — HYDRALAZINE HYDROCHLORIDE 20 MG/ML
10 INJECTION INTRAMUSCULAR; INTRAVENOUS
Status: DISCONTINUED | OUTPATIENT
Start: 2023-05-12 | End: 2023-05-12

## 2023-05-12 RX ORDER — CARVEDILOL 6.25 MG/1
25 TABLET ORAL 2 TIMES DAILY WITH MEALS
Status: DISCONTINUED | OUTPATIENT
Start: 2023-05-12 | End: 2023-05-16 | Stop reason: HOSPADM

## 2023-05-12 RX ORDER — ONDANSETRON 2 MG/ML
4 INJECTION INTRAMUSCULAR; INTRAVENOUS EVERY 4 HOURS PRN
Status: DISCONTINUED | OUTPATIENT
Start: 2023-05-12 | End: 2023-05-16 | Stop reason: HOSPADM

## 2023-05-12 RX ORDER — ONDANSETRON 4 MG/1
4 TABLET, ORALLY DISINTEGRATING ORAL EVERY 4 HOURS PRN
Status: DISCONTINUED | OUTPATIENT
Start: 2023-05-12 | End: 2023-05-16 | Stop reason: HOSPADM

## 2023-05-12 RX ORDER — PROCHLORPERAZINE EDISYLATE 5 MG/ML
5-10 INJECTION INTRAMUSCULAR; INTRAVENOUS EVERY 4 HOURS PRN
Status: DISCONTINUED | OUTPATIENT
Start: 2023-05-12 | End: 2023-05-16 | Stop reason: HOSPADM

## 2023-05-12 RX ORDER — LABETALOL HYDROCHLORIDE 5 MG/ML
10 INJECTION, SOLUTION INTRAVENOUS
Status: DISCONTINUED | OUTPATIENT
Start: 2023-05-12 | End: 2023-05-12

## 2023-05-12 RX ORDER — AMOXICILLIN 250 MG
2 CAPSULE ORAL 2 TIMES DAILY
Status: DISCONTINUED | OUTPATIENT
Start: 2023-05-12 | End: 2023-05-16 | Stop reason: HOSPADM

## 2023-05-12 RX ORDER — PROMETHAZINE HYDROCHLORIDE 25 MG/1
12.5-25 SUPPOSITORY RECTAL EVERY 4 HOURS PRN
Status: DISCONTINUED | OUTPATIENT
Start: 2023-05-12 | End: 2023-05-16 | Stop reason: HOSPADM

## 2023-05-12 RX ORDER — HYDROMORPHONE HYDROCHLORIDE 1 MG/ML
.5-1 INJECTION, SOLUTION INTRAMUSCULAR; INTRAVENOUS; SUBCUTANEOUS
Status: DISCONTINUED | OUTPATIENT
Start: 2023-05-12 | End: 2023-05-16 | Stop reason: HOSPADM

## 2023-05-12 RX ORDER — HUMAN INSULIN 100 [IU]/ML
30 INJECTION, SUSPENSION SUBCUTANEOUS 2 TIMES DAILY
Status: ON HOLD | COMMUNITY
Start: 2023-04-28 | End: 2023-05-16

## 2023-05-12 RX ORDER — SODIUM CHLORIDE 9 MG/ML
500 INJECTION, SOLUTION INTRAVENOUS ONCE
Status: COMPLETED | OUTPATIENT
Start: 2023-05-12 | End: 2023-05-12

## 2023-05-12 RX ORDER — LISINOPRIL 20 MG/1
20 TABLET ORAL DAILY
Status: ON HOLD | COMMUNITY
End: 2023-05-16

## 2023-05-12 RX ORDER — POLYETHYLENE GLYCOL 3350 17 G/17G
1 POWDER, FOR SOLUTION ORAL
Status: DISCONTINUED | OUTPATIENT
Start: 2023-05-12 | End: 2023-05-16 | Stop reason: HOSPADM

## 2023-05-12 RX ORDER — ATORVASTATIN CALCIUM 80 MG/1
80 TABLET, FILM COATED ORAL EVERY EVENING
Status: DISCONTINUED | OUTPATIENT
Start: 2023-05-12 | End: 2023-05-13

## 2023-05-12 RX ORDER — FUROSEMIDE 40 MG/1
40 TABLET ORAL DAILY
Status: DISCONTINUED | OUTPATIENT
Start: 2023-05-12 | End: 2023-05-13

## 2023-05-12 RX ORDER — HYDRALAZINE HYDROCHLORIDE 20 MG/ML
10 INJECTION INTRAMUSCULAR; INTRAVENOUS
Status: COMPLETED | OUTPATIENT
Start: 2023-05-12 | End: 2023-05-12

## 2023-05-12 RX ADMIN — HYDRALAZINE HYDROCHLORIDE 10 MG: 20 INJECTION INTRAMUSCULAR; INTRAVENOUS at 10:08

## 2023-05-12 RX ADMIN — INSULIN HUMAN 2 UNITS: 100 INJECTION, SOLUTION PARENTERAL at 12:50

## 2023-05-12 RX ADMIN — LABETALOL HYDROCHLORIDE 10 MG: 5 INJECTION INTRAVENOUS at 19:35

## 2023-05-12 RX ADMIN — LABETALOL HYDROCHLORIDE 10 MG: 5 INJECTION INTRAVENOUS at 22:09

## 2023-05-12 RX ADMIN — HYDRALAZINE HYDROCHLORIDE 10 MG: 20 INJECTION, SOLUTION INTRAMUSCULAR; INTRAVENOUS at 16:48

## 2023-05-12 RX ADMIN — SODIUM CHLORIDE 500 ML: 9 INJECTION, SOLUTION INTRAVENOUS at 11:39

## 2023-05-12 ASSESSMENT — COGNITIVE AND FUNCTIONAL STATUS - GENERAL
EATING MEALS: TOTAL
SUGGESTED CMS G CODE MODIFIER DAILY ACTIVITY: CN
MOVING FROM LYING ON BACK TO SITTING ON SIDE OF FLAT BED: UNABLE
TOILETING: TOTAL
MOVING TO AND FROM BED TO CHAIR: UNABLE
DAILY ACTIVITIY SCORE: 6
STANDING UP FROM CHAIR USING ARMS: TOTAL
HELP NEEDED FOR BATHING: TOTAL
MOBILITY SCORE: 7
DRESSING REGULAR UPPER BODY CLOTHING: TOTAL
CLIMB 3 TO 5 STEPS WITH RAILING: TOTAL
DRESSING REGULAR LOWER BODY CLOTHING: TOTAL
WALKING IN HOSPITAL ROOM: TOTAL
TURNING FROM BACK TO SIDE WHILE IN FLAT BAD: A LOT
PERSONAL GROOMING: TOTAL
SUGGESTED CMS G CODE MODIFIER MOBILITY: CM

## 2023-05-12 ASSESSMENT — LIFESTYLE VARIABLES
HAVE PEOPLE ANNOYED YOU BY CRITICIZING YOUR DRINKING: NO
EVER HAD A DRINK FIRST THING IN THE MORNING TO STEADY YOUR NERVES TO GET RID OF A HANGOVER: NO
HOW MANY TIMES IN THE PAST YEAR HAVE YOU HAD 5 OR MORE DRINKS IN A DAY: 0
EVER FELT BAD OR GUILTY ABOUT YOUR DRINKING: NO
TOTAL SCORE: 0
TOTAL SCORE: 0
HAVE YOU EVER FELT YOU SHOULD CUT DOWN ON YOUR DRINKING: NO
CONSUMPTION TOTAL: NEGATIVE
ALCOHOL_USE: NO
DOES PATIENT WANT TO STOP DRINKING: NO
TOTAL SCORE: 0
ON A TYPICAL DAY WHEN YOU DRINK ALCOHOL HOW MANY DRINKS DO YOU HAVE: 0
AVERAGE NUMBER OF DAYS PER WEEK YOU HAVE A DRINK CONTAINING ALCOHOL: 0

## 2023-05-12 ASSESSMENT — PATIENT HEALTH QUESTIONNAIRE - PHQ9
1. LITTLE INTEREST OR PLEASURE IN DOING THINGS: NOT AT ALL
SUM OF ALL RESPONSES TO PHQ9 QUESTIONS 1 AND 2: 0
2. FEELING DOWN, DEPRESSED, IRRITABLE, OR HOPELESS: NOT AT ALL

## 2023-05-12 ASSESSMENT — PAIN DESCRIPTION - PAIN TYPE
TYPE: ACUTE PAIN
TYPE: ACUTE PAIN;CHRONIC PAIN

## 2023-05-12 NOTE — ASSESSMENT & PLAN NOTE
With hyperglycemia  Continue insulin sliding scale for now, eventually resume NPH and glipizide when appropriate  Eventual diabetic diet when passes SLP monitoring glucose with goal between 120 and 180

## 2023-05-12 NOTE — PROGRESS NOTES
"Patient arrived via [facility] one at 0757.   HR: 78  RR: 15  Oxygen sat: 97%, Room Air   BP: 178/95  Weight: 83.2kg (Bed scale)  Height: 5'7\" (Stated by patient)  Patients personal belongings include one black cell phone, at patients bedside.     4 Eyes Skin Assessment Completed by Angie RN and LINDSAY Griffin.    Head WDL  Ears WDL  Nose WDL  Mouth Bleeding, old dried blood present  Neck WDL  Breast/Chest Redness, blanching  Shoulder Blades WDL  Spine WDL  (R) Arm/Elbow/Hand Redness and Blanching  (L) Arm/Elbow/Hand Abrasion, elbow red and blanching  Abdomen WDL  Groin WDL  Scrotum/Coccyx/Buttocks WDL  (R) Leg Redness, Swelling, Abrasion, and Edema and Bruising  (L) Leg Redness, Blanching, and Abrasion and Bruising  (R) Heel/Foot/Toe WDL  (L) Heel/Foot/Toe WDL      Devices In Places ECG, Blood Pressure Cuff, Pulse Ox, and SCD's    Interventions In Place Heel Mepilex, Sacral Mepilex, Pillows, Q2 Turns, Low Air Loss Mattress, Heels Loaded W/Pillows, and Pressure Redistribution Mattress    Possible Skin Injury No    Pictures Uploaded Into Epic N/A  Wound Consult Placed N/A  RN Wound Prevention Protocol Ordered Yes   "

## 2023-05-12 NOTE — THERAPY
"Speech Language Pathology   Clinical Swallow Evaluation     Patient Name: Everardo Last  AGE:  62 y.o., SEX:  male  Medical Record #: [id number]  Date of Service: 5/12/2023      History of Present Illness  \"62-year-old male with a past medical history of severe CHF that had improved from 7703-2778.  Presented outside facility with severe right-sided hemiplegia and severe dysarthria.  Outside facility call our facility for transfer.  During encounter call they said the onset was 3 AM.  That the patient was in his kitchen and fell down and crawled and called the ambulance by phone.  He was given TNK at 5:45 AM.  And then transferred to our facility.  Review of the CTA head and neck at the outside facility did not reveal an LVO.  Therefore the patient was admitted to the ICU.  Upon speaking with the patient which was difficult due to his severe dysarthria patient said he fell the day prior.  Therefore the onset was not at 3 AM today but at least over a day ago.  There is also questionable history of a left-sided Bell's palsy from 2 to 3 weeks ago.\"      PMHx:\"Severe CHF that had improved from 2347-1442,  Anxiety, Back pain, Depression, Diabetes mellitus (HCC), Dilated cardiomyopathy (HCC) (April 2015), and Hypertension.\"    CT Head 5/12: \"1.  Wedge-shaped hypoattenuation in the left paramedian omid could indicate acute infarct. MRI brain could be obtained for further evaluation if clinically indicated.  2.  Mild diffuse cerebral substance loss.  3.  Moderate microangiopathic ischemic change versus demyelination or gliosis.  4.  Chronic right caudate head lacunar infarct.  5.  Chronic right maxillary sinusitis.\"    MRI-PENDING    General Information:  Vitals  O2 Delivery Device: None - Room Air  Level of Consciousness: Alert, Awake     Orientation: Oriented x 4  Follows Directives: Yes - simple commands only      Prior Living Situation & Level of Function:  Prior Services: Other (Comments)  Lives with - Patient's Self " "Care Capacity: Unable To Determine At This Time (reported he lives with son and \"his other half\", but did not give any further details.)     Communication: Unknown  Swallowing: Pt reports difficulty \"food piling up in my cheek\" pointed to left, and stated he had to move it to the right to eat for 2-3 weeks.       Oral Mechanism Evaluation:  Dentition: Fair   Facial Symmetry: Total left facial droop, Central right facial droop  Facial Sensation: Impaired - left, Impaired - right     Labial Observations: Open mouth posture, Bilateral weakness (open on left, can close the right)   Lingual Observations: Right lingual deviation  Motor Speech: Severe dysarthria  Comments: Pt with [place] Palsy on left for possibly 2-3 weeks per patient report, new onset of right sided weakness as well.         Laryngeal Function:  Secretion Management:  (Dry mouth noted during this assessment.)  Voice Quality: WFL        Cough: Perceptually weak     Subjective  Rn cleared patient for dysphagia evaluation without restriction.  Patient is alert and agreeable to evaluation.      Assessment  Current Method of Nutrition: NPO until cleared by speech pathology  Positioning: Romo's (60-90 degrees)  Bolus Administration: SLP, Patient    O2 Delivery Device: None - Room Air  Factor(s) Affecting Performance: Impaired endurance  Tracheostomy : No        Swallowing Trials:  Swallowing Trials  Ice: WFL  Thin Liquid (TN0): Impaired  Mildly Thick Liquid (MT2): Not tested  Liquidised (LQ3): Impaired  Pureed (PU4): Impaired      Comments: Oral motor examination reveals bilateral weakness left greater than right.  There is a questionable hx of Left sided Bell's palsy 2-3 weeks ago.  Patient also weak on the right with severe dysarthria.  Patient with oral spill from left side of mouth.  Patient coughing with thin liquids by cup and straw sips.  Patient tolerated a small amount of applesauce, but on the third presentation, he coughed and cleared his throat.  " "Patient is tired and fatigued quickly.  Recommend reassessment of swallow tomorrow.  We will continue to follow for readiness for instrumental assessment.        Clinical Impressions  Patient presents with s/sx concerning for an acute dysphagia. Patient would benefit from a FEES examination to further evaluate the pharyngeal phase of swallow when he is able to participate.  We will continue to follow for ability to participate in FEES.      Recommendations  Diet Consistency: NPO with ice chips after oral care.  Instrumentation: FEES when patient able to participate.    Medication: Non Oral           Oral Care: Q4h         SLP Treatment Plan  Treatment Plan: Dysphagia Treatment, Patient/Family/Caregiver Training  SLP Frequency: 3x Per Week  Estimated Duration: Until Therapy Goals Met      Anticipated Discharge Needs  Discharge Recommendations: Recommend post-acute placement for additional speech therapy services prior to discharge home   Therapy Recommendations Upon DC: Dysphagia Training, Patient / Family / Caregiver Education        Patient / Family Goals  Patient / Family Goal #1: \"My mouth is dry\"  Short Term Goals  Short Term Goal # 1: Patient will consume pre-feeding trials without overt s/s of aspiraiton.      Lianna Miller, SLP   "

## 2023-05-12 NOTE — PROGRESS NOTES
Med rec complete per pt at bedside  Interviewed pt with family at bedside with permission from pt  Allergies reviewed and updated.  Pt has not started Metformin Yet. Per doctor Pt stopped Spironolactone 2 weeks ago.Pt had 4-5 aspirin morning of stroke.

## 2023-05-12 NOTE — CONSULTS
Neurology STROKE CODE H&P  Neurohospitalist Service, [facility]    Referring Physician: Jeremy M Gonda, M.D.    STROKE CODE: No chief complaint on file.      To obtain the most accurate data regarding the time called, and time patient seen, refer to the stroke run-sheet and chart.  For time of CT, refer to the radiology report. See A&P below for TPA Decision and door to needle time if and when applicable.    HPI: 62-year-old male with a past medical history of severe CHF that had improved from 1600-2186.  Presented outside facility with severe right-sided hemiplegia and severe dysarthria.  Outside facility call our facility for transfer.  During encounter call they said the onset was 3 AM.  That the patient was in his kitchen and fell down and crawled and called the ambulance by phone.  He was given TNK at 5:45 AM.  And then transferred to our facility.  Review of the CTA head and neck at the outside facility did not reveal an LVO.  Therefore the patient was admitted to the ICU.  Upon speaking with the patient which was difficult due to his severe dysarthria patient said he fell the day prior.  Therefore the onset was not at 3 AM today but at least over a day ago.  There is also questionable history of a left-sided Bell's palsy from 2 to 3 weeks ago.      Review of systems: In addition to what is detailed in the HPI above, (and scanned into the chart if and when applicable), all other systems reviewed and are negative.    Past Medical History:    has a past medical history of Anxiety, Back pain, Depression, Diabetes mellitus (HCC), Dilated cardiomyopathy (HCC) (April 2015), and Hypertension.    FHx:  family history includes Heart Attack in his mother; Heart Disease in his father; Stroke in his father.    SHx:   reports that he quit smoking about 29 years ago. His smoking use included cigarettes. He has never used smokeless tobacco. He reports that he does not drink alcohol and does not use  drugs.    Allergies:  Allergies   Allergen Reactions    Codeine Rash     Patient states he gets rash on his arm      Keflex Itching     Patient states he begins to itch all over     Statins [Hmg-Coa-R Inhibitors] Myalgia       Medications:    Current Facility-Administered Medications:     labetalol (NORMODYNE/TRANDATE) injection 10 mg, 10 mg, Intravenous, Q10 MIN PRN, Jeremy M Gonda, M.D.    hydrALAZINE (APRESOLINE) injection 10 mg, 10 mg, Intravenous, Q2HRS PRN, Jeremy M Gonda, M.D.    niCARdipine (CARDENE) 25 mg in  mL Standard Infusion, 0-15 mg/hr, Intravenous, Continuous, Jeremy M Gonda, M.D.    atorvastatin (LIPITOR) tablet 80 mg, 80 mg, Oral, Q EVENING, Jeremy M Gonda, M.D.    senna-docusate (PERICOLACE or SENOKOT S) 8.6-50 MG per tablet 2 Tablet, 2 Tablet, Oral, BID **AND** polyethylene glycol/lytes (MIRALAX) PACKET 1 Packet, 1 Packet, Oral, QDAY PRN **AND** magnesium hydroxide (MILK OF MAGNESIA) suspension 30 mL, 30 mL, Oral, QDAY PRN **AND** bisacodyl (DULCOLAX) suppository 10 mg, 10 mg, Rectal, QDAY PRN, Jeremy M Gonda, M.D.    ondansetron (ZOFRAN) syringe/vial injection 4 mg, 4 mg, Intravenous, Q4HRS PRN, Jeremy M Gonda, M.D.    ondansetron (ZOFRAN ODT) dispertab 4 mg, 4 mg, Oral, Q4HRS PRN, Jeremy M Gonda, M.D.    promethazine (PHENERGAN) tablet 12.5-25 mg, 12.5-25 mg, Oral, Q4HRS PRN, Jeremy M Gonda, M.D.    promethazine (PHENERGAN) suppository 12.5-25 mg, 12.5-25 mg, Rectal, Q4HRS PRN, Jeremy M Gonda, M.D.    prochlorperazine (COMPAZINE) injection 5-10 mg, 5-10 mg, Intravenous, Q4HRS PRN, Jeremy M Gonda, M.D.    acetaminophen (Tylenol) tablet 650 mg, 650 mg, Oral, Q6HRS PRN, Jeremy M Gonda, M.D.    insulin regular (HumuLIN R,NovoLIN R) injection, 1-6 Units, Subcutaneous, Q6HRS **AND** POC blood glucose manual result, , , Q6H **AND** NOTIFY MD and PharmD, , , Once **AND** Administer 20 grams of glucose (approximately 8 ounces of fruit juice) every 15 minutes PRN FSBG less than 70 mg/dL, , , PRN  "**AND** dextrose 10 % BOLUS 25 g, 25 g, Intravenous, Q15 MIN PRN, Jeremy M Gonda, M.D.    Physical Examination:    Vitals:    05/12/23 0800 05/12/23 0830   BP: (!) 178/95 (!) 154/87   Pulse: 78 77   Resp: 15 (!) 34   Temp: 36.1 °C (96.9 °F)    TempSrc: Temporal    SpO2:  93%   Weight: 83.2 kg (183 lb 6.8 oz)    Height: 1.702 m (5' 7\")        General: Patient is awake and in no acute distress  Eyes: Unremarkable CV: RRR    NEUROLOGICAL EXAM:     Mental status: Awake and alert follows commands  Speech and language: Severely dysarthric almost unintelligible however he follows commands fully.  Does not appear to be aphasic.    Cranial nerve exam: Pupils are equal reactive.  Nose no gaze preference.  No nystagmus.  Does have a ptosis on the right with right facial weakness.  Sensation appears to be mostly intact.  Severely dysarthric.  Bulbar weakness.  Visual field appears to be intact blinks to threat.  Shoulder shrug absent on the right.  Tongue slight right protrusion.    Motor exam: Sustained normal strength in the left arm extremity.  0 out of 5 strength in the right arm and leg.    Sensory exam: Very slight decrease sensation on the right compared to left.  Deep tendon reflexes: Equivocal toe on the right downgoing on the left  Coordination: no ataxia   Gait: deferred due to severe weakness    NIH Stroke Scale:    1a. Level of Consciousness (Alert, drowsy, etc): 0= Alert    1b. LOC Questions (Month, age): 0= Answers both correctly    1c. LOC Commands (Open/close eyes make fist/let go): 0= Obeys both correctly    2.   Best Gaze (Eyes open - patient follows examiner's finger on face): 0= Normal    3.   Visual Fields (introduce visual stimulus/threat to patient's field quadrants): 0= No visual loss  4.   Facial Paresis (Show teeth, raise eyebrows and squeeze eyes shut): 2 = Partial     5a. Motor Arm - Left (Elevate arm to 90 degrees if patient is sitting, 45 degrees if  supine): 0= No drift    5b. Motor Arm - Right " (Elevate arm to 90 degrees if patient is sitting, 45 degrees if supine): 4= No movement    6a. Motor Leg - Left (Elevate leg 30 degrees with patient supine): 0= No drift    6b. Motor Leg - Right  (Elevate leg 30 degrees with patient supine): 4= No movement    7.   Limb Ataxia (Finger-nose, heel down shin): 0= No ataxia    8.   Sensory (Pin prick to face, arm, trunk and leg - compare side to side): 1= Partial loss    9.  Best Language (Name item, describe a picture and read sentences): 0= No aphasia    10. Dysarthria (Evaluate speech clarity by patient repeating listed words): 2= Near to unintelligible or worse    11. Extinction and Inattention (Use information from prior testing to identify neglect or  double simultaneous stimuli testing): 0= No neglect    Total NIH Score: 13    Modified Worth Scale (MRS): 0 = No symptoms      Objective Data:    Labs:  Lab Results   Component Value Date/Time    PROTHROMBTM 16.9 (H) 04/15/2015 08:58 AM    INR 1.40 (H) 04/15/2015 08:58 AM      Lab Results   Component Value Date/Time    WBC 8.4 04/15/2015 04:31 AM    RBC 4.98 04/15/2015 04:31 AM    HEMOGLOBIN 12.7 (L) 04/15/2015 04:31 AM    HEMATOCRIT 41.0 (L) 04/15/2015 04:31 AM    MCV 82.3 04/15/2015 04:31 AM    MCH 25.5 (L) 04/15/2015 04:31 AM    MCHC 31.0 (L) 04/15/2015 04:31 AM    MPV 9.8 04/15/2015 04:31 AM    NEUTSPOLYS 72.7 (H) 04/15/2015 04:31 AM    LYMPHOCYTES 16.7 (L) 04/15/2015 04:31 AM    MONOCYTES 8.4 04/15/2015 04:31 AM    EOSINOPHILS 1.2 04/15/2015 04:31 AM    BASOPHILS 0.6 04/15/2015 04:31 AM      Lab Results   Component Value Date/Time    SODIUM 142 04/17/2015 03:21 AM    POTASSIUM 3.6 04/17/2015 03:21 AM    CHLORIDE 108 04/17/2015 03:21 AM    CO2 27 04/17/2015 03:21 AM    GLUCOSE 102 (H) 04/17/2015 03:21 AM    BUN 34 (H) 04/17/2015 03:21 AM    CREATININE 1.51 (H) 04/17/2015 03:21 AM      No results found for: CHOLSTRLTOT, LDL, HDL, TRIGLYCERIDE    Lab Results   Component Value Date/Time    ALKPHOSPHAT 56 04/17/2015  03:21 AM    ASTSGOT 17 04/17/2015 03:21 AM    ALTSGPT 17 04/17/2015 03:21 AM    TBILIRUBIN 0.6 04/17/2015 03:21 AM        Imaging/Testing:  CT-HEAD W/O    (Results Pending)   EC-ECHOCARDIOGRAM COMPLETE W/O CONT    (Results Pending)   DX-PELVIS-TRAUMA SERIES  3-    (Results Pending)   DX-FEMUR-2+ RIGHT    (Results Pending)         Assessment and Plan:    62-year-old male presents as a facility with cute onset of right-sided hemiplegia.  Status post TNK.  The onset appears to be over a day ago.  Different from the information given during the transfer.  Given this new information we will get a stat CT head to make sure there is no hemorrhagic conversion.  Clinically appears the patient has a left sided pontine infarct resulting in severe hemiplegia on the right and bulbar weakness.  With relatively preserved sensory exam.  Etiology is unknown at this time.  Could be cardioembolic given his distant history of severe cardiomyopathy with ejection fraction 15% that improved into the 50s 5 years ago.  Given the concern for hemorrhagic version recommend keeping the blood pressure lower than the standard post thrombolytic protocol.    Plan:  1.  Stat CT head  2.  Blood pressure parameters keep below 150 systolic.  3.  MRI brain no need to wait for 24 hours.  4.  Telemetry monitoring  5.  TTE  6.  Check lipid panel start statin for an LDL below 70  7.  Maintain normoglycemia  8.  No antiplatelets or anticoagulation for the next 24 hours at least.  9.  PT/OT/speech.  Okay to start speech therapy today.  10.  Mechanical DVT prophylaxis only today        The evaluation of the patient, and recommended management, was discussed with the resident staff.     This chart was partially generated using voice recognition technology and sound alike word replacement may be present, best efforts were made to make the chart accurate.    Jarett Decker MD  Board Certified Neurology, ABPN  (t) [phone]

## 2023-05-12 NOTE — ASSESSMENT & PLAN NOTE
With improved ejection fraction in 2017 up to 50%  Repeat echocardiogram pending today  Resume heart failure medications including Coreg, Lasix, and lisinopril today

## 2023-05-12 NOTE — ASSESSMENT & PLAN NOTE
Acute ischemic stroke likely occurring 5/11 in the afternoon now s/p delayed TNK administration 5/12 at 0445  Echo pending  Strict blood pressure management with goal SBP less than 140   PT/OT/SLP evaluation  Antiplatelets and statin  Neurology consulted  Close neurologic monitoring

## 2023-05-12 NOTE — CONSULTS
Critical Care Consultation    Date of consult: 5/12/2023    Referring Physician  OSH Transfer    Reason for Consultation  CVA    History of Presenting Illness  62 y.o. male who presented 5/12/2023 with a past medical history significant for diabetes, hypertension, and a cardiomyopathy who presented to [place] emergency department today after being found down on the floor at 3 AM with right-sided hemiplegia and aphasia.  He had a normal head CT as well as CT angio at the outside hospital and in consultation with neurology was administered TNK at 0 445 for an NIH stroke scale of 19.  His blood pressure initially was 180 and improved to 140 and his glucose was 137.  Per the transfer records, patient had recently been diagnosed with Bell's palsy on the left side 1 month ago but no history of stroke.  He is compliant with his medications.  Upon arrival, patient was able to provide additional history that he actually had sudden onset of right-sided weakness yesterday afternoon and had to crawl around on the floor until 3 AM when he could finally get to the phone to call for help.  His wife was out of town in [place].    Code Status  Full Code    Review of Systems  Review of Systems   Unable to perform ROS: Mental status change       Past Medical History   has a past medical history of Anxiety, Back pain, Depression, Diabetes mellitus (HCC), Dilated cardiomyopathy (HCC) (April 2015), and Hypertension.    Surgical History   has a past surgical history that includes eye surgery; other orthopedic surgery; appendectomy; and zzz cardiac cath (04/15/2015).    Family History  family history includes Heart Attack in his mother; Heart Disease in his father; Stroke in his father.    Social History   reports that he quit smoking about 29 years ago. His smoking use included cigarettes. He has never used smokeless tobacco. He reports that he does not drink alcohol and does not use drugs.    Medications  Home Medications     **Home medications have not yet been reviewed for this encounter**       Current Facility-Administered Medications   Medication Dose Route Frequency Provider Last Rate Last Admin    labetalol (NORMODYNE/TRANDATE) injection 10 mg  10 mg Intravenous Q10 MIN PRN Jeremy M Gonda, M.D.        hydrALAZINE (APRESOLINE) injection 10 mg  10 mg Intravenous Q2HRS PRN Jeremy M Gonda, M.D.        niCARdipine (CARDENE) 25 mg in  mL Standard Infusion  0-15 mg/hr Intravenous Continuous Jeremy M Gonda, M.D.        atorvastatin (LIPITOR) tablet 80 mg  80 mg Oral Q EVENING Jeremy M Gonda, M.D.        senna-docusate (PERICOLACE or SENOKOT S) 8.6-50 MG per tablet 2 Tablet  2 Tablet Oral BID Jeremy M Gonda, M.D.        And    polyethylene glycol/lytes (MIRALAX) PACKET 1 Packet  1 Packet Oral QDAY PRN Jeremy M Gonda, M.D.        And    magnesium hydroxide (MILK OF MAGNESIA) suspension 30 mL  30 mL Oral QDAY PRN Jeremy M Gonda, M.D.        And    bisacodyl (DULCOLAX) suppository 10 mg  10 mg Rectal QDAY PRN Jeremy M Gonda, M.D.        ondansetron (ZOFRAN) syringe/vial injection 4 mg  4 mg Intravenous Q4HRS PRN Jeremy M Gonda, M.D.        ondansetron (ZOFRAN ODT) dispertab 4 mg  4 mg Oral Q4HRS PRN Jeremy M Gonda, M.D.        promethazine (PHENERGAN) tablet 12.5-25 mg  12.5-25 mg Oral Q4HRS PRN Jeremy M Gonda, M.D.        promethazine (PHENERGAN) suppository 12.5-25 mg  12.5-25 mg Rectal Q4HRS PRN Jeremy M Gonda, M.D.        prochlorperazine (COMPAZINE) injection 5-10 mg  5-10 mg Intravenous Q4HRS PRN Jeremy M Gonda, M.D.        acetaminophen (Tylenol) tablet 650 mg  650 mg Oral Q6HRS PRN Jeremy M Gonda, M.D.        insulin regular (HumuLIN R,NovoLIN R) injection  1-6 Units Subcutaneous Q6HRS Jeremy M Gonda, M.D.        And    dextrose 10 % BOLUS 25 g  25 g Intravenous Q15 MIN PRN Jeremy M Gonda, M.D.           Allergies  Allergies   Allergen Reactions    Codeine Rash     Patient states he gets rash on his arm      Keflex Itching      Patient states he begins to itch all over     Statins [Hmg-Coa-R Inhibitors] Myalgia       Vital Signs last 24 hours  Temp:  [36.1 °C (96.9 °F)] 36.1 °C (96.9 °F)  Pulse:  [77-78] 77  Resp:  [15-34] 34  BP: (154-178)/(87-95) 154/87  SpO2:  [93 %] 93 %    Physical Exam  Physical Exam  Vitals and nursing note reviewed.   Constitutional:       General: He is awake. He is in acute distress.      Appearance: He is well-developed and normal weight. He is not toxic-appearing.      Interventions: Nasal cannula in place.   HENT:      Head: Normocephalic and atraumatic.      Nose: Nose normal. No congestion.      Mouth/Throat:      Mouth: Mucous membranes are dry.      Pharynx: Oropharynx is clear. No posterior oropharyngeal erythema.   Eyes:      General: No scleral icterus.     Conjunctiva/sclera: Conjunctivae normal.      Pupils: Pupils are equal, round, and reactive to light.      Comments: Right eyelid ptosis   Neck:      Vascular: No JVD.   Cardiovascular:      Rate and Rhythm: Normal rate and regular rhythm. Occasional Extrasystoles are present.     Chest Wall: PMI is not displaced.      Pulses: Normal pulses.      Heart sounds: Normal heart sounds. No murmur heard.  Pulmonary:      Effort: Pulmonary effort is normal. No respiratory distress.      Breath sounds: No stridor. Examination of the left-lower field reveals rhonchi. Rhonchi present. No wheezing.      Comments: Protecting airway, gag and cough intact  Abdominal:      General: Bowel sounds are normal. There is no distension.      Palpations: Abdomen is soft.      Tenderness: There is no abdominal tenderness. There is no guarding or rebound.   Genitourinary:     Comments: Faust catheter in place  Musculoskeletal:         General: No tenderness.      Cervical back: Neck supple. No rigidity or tenderness.      Right lower leg: No edema.      Left lower leg: No edema.   Skin:     General: Skin is warm and dry.      Capillary Refill: Capillary refill takes less than  2 seconds.      Coloration: Skin is not pale.   Neurological:      Mental Status: He is alert and oriented to person, place, and time.      Cranial Nerves: Cranial nerve deficit present.      Sensory: No sensory deficit.      Motor: Weakness present.      Coordination: Coordination abnormal.      Comments: Right-sided weakness with right facial droop, slurred speech, vision changes   Psychiatric:         Mood and Affect: Mood normal.         Behavior: Behavior normal. Behavior is cooperative.         Fluids  No intake or output data in the 24 hours ending 05/12/23 0850    Laboratory  No results found for this or any previous visit (from the past 48 hour(s)).    Imaging  OUTSIDE IMAGES-DX CHEST   Final Result      OUTSIDE IMAGES-CT HEAD   Final Result      OUTSIDE IMAGES-CT HEAD   Final Result      CT-FOREIGN FILM CAT SCAN   Final Result      DX-FEMUR-2+ RIGHT   Final Result      No acute osseous abnormality.      DX-PELVIS-1 OR 2 VIEWS   Final Result      No acute osseous abnormality.      CT-HEAD W/O   Final Result      1.  Wedge-shaped hypoattenuation in the left paramedian omid could indicate acute infarct. MRI brain could be obtained for further evaluation if clinically indicated.   2.  Mild diffuse cerebral substance loss.   3.  Moderate microangiopathic ischemic change versus demyelination or gliosis.   4.  Chronic right caudate head lacunar infarct.   5.  Chronic right maxillary sinusitis.         EC-ECHOCARDIOGRAM COMPLETE W/O CONT    (Results Pending)    *Personally reviewed x-rays and CT scan and agree with interpretations above    Assessment/Plan  * Acute cerebrovascular accident (CVA) (HCC)- (present on admission)  Assessment & Plan  Acute ischemic stroke likely occurring 5/11 in the afternoon now s/p delayed TNK administration 5/12 at 0445  Repeat head CT now to evaluate for hemorrhagic transformation  MRI brain, echo, lipid panel, hemoglobin A1c  Strict blood pressure management with goal SBP less than  140 given delayed TNK administration and high risk of hemorrhagic transformation  PT/OT/SLP evaluation  Antiplatelets and statin to be started at 24-hour handy post TNK  Neurology consulted  Close neurologic monitoring    Type 2 diabetes mellitus without complication, without long-term current use of insulin (HCC)- (present on admission)  Assessment & Plan  With hyperglycemia  Insulin sliding scale for now, eventually resume NPH and glipizide when appropriate  Eventual diabetic diet when passes SLP monitoring glucose with goal between 120 and 180    Mixed hyperlipidemia- (present on admission)  Assessment & Plan  Begin high intensity statin  Check lipid panel    Essential hypertension, benign- (present on admission)  Assessment & Plan  Strict blood pressure goals with a systolic blood pressure less than 140  As needed hydralazine, labetalol, nicardipine  Resume outpatient Coreg, Lasix, and lisinopril doses    Dilated cardiomyopathy (HCC)- (present on admission)  Assessment & Plan  With improved ejection fraction in 2017 up to 50%  Repeat echocardiogram today  Resume heart failure medications including Coreg, Lasix, and lisinopril when appropriate    Pain of right hip  Assessment & Plan  From fall yesterday in the setting of stroke  X-rays of pelvis and right femur  As needed analgesics  PT/OT        Discussed patient condition and risk of morbidity and/or mortality with RN, RT, Pharmacy, , Charge nurse / hot rounds, Patient, and neurology.    The patient remains critically ill.  Critical care time = 38 minutes in directly providing and coordinating critical care and extensive data review.  No time overlap and excludes procedures.    Please note that this dictation was created using voice recognition software. I have made every reasonable attempt to correct obvious errors, but there may be errors of grammar and possibly content that I did not discover before finalizing the note.

## 2023-05-12 NOTE — ASSESSMENT & PLAN NOTE
Continue strict blood pressure goals with a systolic blood pressure less than 140  As needed hydralazine, labetalol, nicardipine  Resume outpatient Coreg, Lasix, and lisinopril doses

## 2023-05-12 NOTE — PROGRESS NOTES
[facility] INTENSIVIST DIRECT ADMISSION REPORT    Transferring facility:   Transferring physician: Dr Jake Swanson  Chief complaint: New onset right hemiplegia with aphasia  Pertinent history & patient course: 62-year-old male with a history of diabetes, hypertension and dilated cardiomyopathy with recent diagnosis 1 month ago of left-sided Bell's palsy presented with new onset right hemiplegia and aphasia at 3 AM.  There is no associated headache.  There were no contraindications to anticoagulation or lytic therapy.  CT noncontrast was negative for bleed or mass.  CTA head and neck was negative for thrombus.  Initial systolic  improved to the 140s on its own and TNK was started at 4:45 AM.  Initial NIH stroke score was 19.  Neurology reviewed the case with Dr. Swanson and approved TNK and transfer to our neuro ICU.  Pertinent imaging & lab results: Glucose 137  Code Status: Full per transferring provider, I personally verified with the transferring provider patient's code status and the transferring provider has confirmed this with the patient.  Further work up or recommendations prior to transfer: None  Consultants called prior to transfer and pertinent input from consultants: Neurology, accepted in transfer and recommended TNK  Patient accepted for transfer: Yes  Consultants to be called upon arrival: Neurology and RCC  Floor requested: Neuro ICU  ADT order placed for accepted patient: Yes    Please inform the Intensivist upon assignment of an ICU bed and then again on arrival of the patient.

## 2023-05-12 NOTE — ASSESSMENT & PLAN NOTE
From fall yesterday in the setting of stroke  X-rays of pelvis and right femur negative  As needed analgesics  PT/OT

## 2023-05-12 NOTE — DISCHARGE PLANNING
PM&R referral from Dr. Gonda. Stroke protocol NIHSS 13. Medicare provider potential family support. Physiatry to consult per protocol.

## 2023-05-13 PROBLEM — R47.1 DYSARTHRIA: Status: ACTIVE | Noted: 2023-05-13

## 2023-05-13 PROBLEM — I69.351 FLACCID HEMIPLEGIA OF RIGHT DOMINANT SIDE AS LATE EFFECT OF CEREBRAL INFARCTION (HCC): Status: ACTIVE | Noted: 2023-05-13

## 2023-05-13 PROBLEM — I63.81 MULTIPLE LACUNAR INFARCTS (HCC): Status: ACTIVE | Noted: 2023-05-13

## 2023-05-13 LAB
ANION GAP SERPL CALC-SCNC: 13 MMOL/L (ref 7–16)
BASOPHILS # BLD AUTO: 0.3 % (ref 0–1.8)
BASOPHILS # BLD: 0.04 K/UL (ref 0–0.12)
BUN SERPL-MCNC: 22 MG/DL (ref 8–22)
CALCIUM SERPL-MCNC: 8.6 MG/DL (ref 8.5–10.5)
CHLORIDE SERPL-SCNC: 108 MMOL/L (ref 96–112)
CHOLEST SERPL-MCNC: 126 MG/DL (ref 100–199)
CO2 SERPL-SCNC: 21 MMOL/L (ref 20–33)
CREAT SERPL-MCNC: 1.47 MG/DL (ref 0.5–1.4)
EKG IMPRESSION: NORMAL
EOSINOPHIL # BLD AUTO: 0.09 K/UL (ref 0–0.51)
EOSINOPHIL NFR BLD: 0.8 % (ref 0–6.9)
ERYTHROCYTE [DISTWIDTH] IN BLOOD BY AUTOMATED COUNT: 48.1 FL (ref 35.9–50)
GFR SERPLBLD CREATININE-BSD FMLA CKD-EPI: 53 ML/MIN/1.73 M 2
GLUCOSE BLD STRIP.AUTO-MCNC: 132 MG/DL (ref 65–99)
GLUCOSE BLD STRIP.AUTO-MCNC: 161 MG/DL (ref 65–99)
GLUCOSE BLD STRIP.AUTO-MCNC: 172 MG/DL (ref 65–99)
GLUCOSE BLD STRIP.AUTO-MCNC: 176 MG/DL (ref 65–99)
GLUCOSE SERPL-MCNC: 163 MG/DL (ref 65–99)
HCT VFR BLD AUTO: 37.2 % (ref 42–52)
HDLC SERPL-MCNC: 30 MG/DL
HGB BLD-MCNC: 12.1 G/DL (ref 14–18)
IMM GRANULOCYTES # BLD AUTO: 0.06 K/UL (ref 0–0.11)
IMM GRANULOCYTES NFR BLD AUTO: 0.5 % (ref 0–0.9)
LDLC SERPL CALC-MCNC: 70 MG/DL
LYMPHOCYTES # BLD AUTO: 1.75 K/UL (ref 1–4.8)
LYMPHOCYTES NFR BLD: 15.2 % (ref 22–41)
MAGNESIUM SERPL-MCNC: 2.3 MG/DL (ref 1.5–2.5)
MCH RBC QN AUTO: 28.1 PG (ref 27–33)
MCHC RBC AUTO-ENTMCNC: 32.5 G/DL (ref 33.7–35.3)
MCV RBC AUTO: 86.5 FL (ref 81.4–97.8)
MONOCYTES # BLD AUTO: 0.75 K/UL (ref 0–0.85)
MONOCYTES NFR BLD AUTO: 6.5 % (ref 0–13.4)
NEUTROPHILS # BLD AUTO: 8.81 K/UL (ref 1.82–7.42)
NEUTROPHILS NFR BLD: 76.7 % (ref 44–72)
NRBC # BLD AUTO: 0 K/UL
NRBC BLD-RTO: 0 /100 WBC
PLATELET # BLD AUTO: 183 K/UL (ref 164–446)
PMV BLD AUTO: 10 FL (ref 9–12.9)
POTASSIUM SERPL-SCNC: 4.3 MMOL/L (ref 3.6–5.5)
RBC # BLD AUTO: 4.3 M/UL (ref 4.7–6.1)
SODIUM SERPL-SCNC: 142 MMOL/L (ref 135–145)
TRIGL SERPL-MCNC: 129 MG/DL (ref 0–149)
WBC # BLD AUTO: 11.5 K/UL (ref 4.8–10.8)

## 2023-05-13 PROCEDURE — A9270 NON-COVERED ITEM OR SERVICE: HCPCS | Performed by: PSYCHIATRY & NEUROLOGY

## 2023-05-13 PROCEDURE — 80048 BASIC METABOLIC PNL TOTAL CA: CPT

## 2023-05-13 PROCEDURE — A9270 NON-COVERED ITEM OR SERVICE: HCPCS | Performed by: INTERNAL MEDICINE

## 2023-05-13 PROCEDURE — 92612 ENDOSCOPY SWALLOW (FEES) VID: CPT

## 2023-05-13 PROCEDURE — 99223 1ST HOSP IP/OBS HIGH 75: CPT | Performed by: PHYSICAL MEDICINE & REHABILITATION

## 2023-05-13 PROCEDURE — 700102 HCHG RX REV CODE 250 W/ 637 OVERRIDE(OP): Performed by: PSYCHIATRY & NEUROLOGY

## 2023-05-13 PROCEDURE — 99233 SBSQ HOSP IP/OBS HIGH 50: CPT | Performed by: INTERNAL MEDICINE

## 2023-05-13 PROCEDURE — 770020 HCHG ROOM/CARE - TELE (206)

## 2023-05-13 PROCEDURE — 93010 ELECTROCARDIOGRAM REPORT: CPT | Performed by: INTERNAL MEDICINE

## 2023-05-13 PROCEDURE — 700102 HCHG RX REV CODE 250 W/ 637 OVERRIDE(OP): Performed by: INTERNAL MEDICINE

## 2023-05-13 PROCEDURE — 700111 HCHG RX REV CODE 636 W/ 250 OVERRIDE (IP): Performed by: INTERNAL MEDICINE

## 2023-05-13 PROCEDURE — 99233 SBSQ HOSP IP/OBS HIGH 50: CPT | Performed by: PSYCHIATRY & NEUROLOGY

## 2023-05-13 PROCEDURE — 85025 COMPLETE CBC W/AUTO DIFF WBC: CPT

## 2023-05-13 PROCEDURE — 99223 1ST HOSP IP/OBS HIGH 75: CPT | Performed by: HOSPITALIST

## 2023-05-13 PROCEDURE — 82962 GLUCOSE BLOOD TEST: CPT

## 2023-05-13 PROCEDURE — 700102 HCHG RX REV CODE 250 W/ 637 OVERRIDE(OP): Performed by: HOSPITALIST

## 2023-05-13 PROCEDURE — 83735 ASSAY OF MAGNESIUM: CPT

## 2023-05-13 PROCEDURE — A9270 NON-COVERED ITEM OR SERVICE: HCPCS | Performed by: HOSPITALIST

## 2023-05-13 PROCEDURE — 80061 LIPID PANEL: CPT

## 2023-05-13 PROCEDURE — 700111 HCHG RX REV CODE 636 W/ 250 OVERRIDE (IP): Performed by: NURSE PRACTITIONER

## 2023-05-13 RX ORDER — ENOXAPARIN SODIUM 100 MG/ML
40 INJECTION SUBCUTANEOUS DAILY
Status: DISCONTINUED | OUTPATIENT
Start: 2023-05-13 | End: 2023-05-16 | Stop reason: HOSPADM

## 2023-05-13 RX ORDER — ASPIRIN 81 MG/1
81 TABLET, CHEWABLE ORAL DAILY
Status: DISCONTINUED | OUTPATIENT
Start: 2023-05-13 | End: 2023-05-16 | Stop reason: HOSPADM

## 2023-05-13 RX ORDER — LISINOPRIL 20 MG/1
20 TABLET ORAL ONCE
Status: COMPLETED | OUTPATIENT
Start: 2023-05-13 | End: 2023-05-13

## 2023-05-13 RX ORDER — HYDRALAZINE HYDROCHLORIDE 25 MG/1
25 TABLET, FILM COATED ORAL EVERY 6 HOURS PRN
Status: DISCONTINUED | OUTPATIENT
Start: 2023-05-13 | End: 2023-05-14

## 2023-05-13 RX ORDER — ATORVASTATIN CALCIUM 40 MG/1
40 TABLET, FILM COATED ORAL EVERY EVENING
Status: DISCONTINUED | OUTPATIENT
Start: 2023-05-14 | End: 2023-05-16 | Stop reason: HOSPADM

## 2023-05-13 RX ORDER — LISINOPRIL 20 MG/1
40 TABLET ORAL DAILY
Status: DISCONTINUED | OUTPATIENT
Start: 2023-05-14 | End: 2023-05-16 | Stop reason: HOSPADM

## 2023-05-13 RX ORDER — FUROSEMIDE 10 MG/ML
20 INJECTION INTRAMUSCULAR; INTRAVENOUS
Status: DISCONTINUED | OUTPATIENT
Start: 2023-05-13 | End: 2023-05-15

## 2023-05-13 RX ADMIN — INSULIN HUMAN 1 UNITS: 100 INJECTION, SOLUTION PARENTERAL at 00:39

## 2023-05-13 RX ADMIN — ASPIRIN 81 MG 81 MG: 81 TABLET ORAL at 11:34

## 2023-05-13 RX ADMIN — CARVEDILOL 25 MG: 6.25 TABLET, FILM COATED ORAL at 16:50

## 2023-05-13 RX ADMIN — INSULIN HUMAN 1 UNITS: 100 INJECTION, SOLUTION PARENTERAL at 17:13

## 2023-05-13 RX ADMIN — FUROSEMIDE 20 MG: 10 INJECTION, SOLUTION INTRAVENOUS at 16:51

## 2023-05-13 RX ADMIN — HYDROMORPHONE HYDROCHLORIDE 1 MG: 1 INJECTION, SOLUTION INTRAMUSCULAR; INTRAVENOUS; SUBCUTANEOUS at 14:10

## 2023-05-13 RX ADMIN — ATORVASTATIN CALCIUM 80 MG: 80 TABLET, FILM COATED ORAL at 17:12

## 2023-05-13 RX ADMIN — FUROSEMIDE 20 MG: 10 INJECTION, SOLUTION INTRAVENOUS at 08:43

## 2023-05-13 RX ADMIN — LISINOPRIL 20 MG: 20 TABLET ORAL at 09:59

## 2023-05-13 RX ADMIN — SENNOSIDES AND DOCUSATE SODIUM 2 TABLET: 50; 8.6 TABLET ORAL at 17:12

## 2023-05-13 RX ADMIN — ACETAMINOPHEN 325 MG: 325 TABLET, FILM COATED ORAL at 15:36

## 2023-05-13 RX ADMIN — INSULIN HUMAN 1 UNITS: 100 INJECTION, SOLUTION PARENTERAL at 11:36

## 2023-05-13 RX ADMIN — ENOXAPARIN SODIUM 40 MG: 100 INJECTION SUBCUTANEOUS at 17:12

## 2023-05-13 RX ADMIN — HYDROMORPHONE HYDROCHLORIDE 1 MG: 1 INJECTION, SOLUTION INTRAMUSCULAR; INTRAVENOUS; SUBCUTANEOUS at 00:00

## 2023-05-13 ASSESSMENT — ENCOUNTER SYMPTOMS
VOMITING: 0
NAUSEA: 0
BRUISES/BLEEDS EASILY: 0
ABDOMINAL PAIN: 0
BACK PAIN: 0
SORE THROAT: 0
HEADACHES: 0
NERVOUS/ANXIOUS: 1
DEPRESSION: 0
DIZZINESS: 0
SPUTUM PRODUCTION: 0
COUGH: 0
WEAKNESS: 1
BLURRED VISION: 0
RESPIRATORY NEGATIVE: 1
CHILLS: 0
EYES NEGATIVE: 1
PALPITATIONS: 0
CARDIOVASCULAR NEGATIVE: 1
SHORTNESS OF BREATH: 0
FOCAL WEAKNESS: 1
GASTROINTESTINAL NEGATIVE: 1
MYALGIAS: 1
NERVOUS/ANXIOUS: 0
SPEECH CHANGE: 1
SENSORY CHANGE: 1
FEVER: 0
MYALGIAS: 0

## 2023-05-13 ASSESSMENT — PAIN DESCRIPTION - PAIN TYPE
TYPE: CHRONIC PAIN;ACUTE PAIN
TYPE: ACUTE PAIN

## 2023-05-13 NOTE — PROGRESS NOTES
4 Eyes Skin Assessment Completed by Buffy RN and Randy RN.    Head WDL  Ears WDL  Nose WDL  Mouth WDL  Neck WDL  Breast/Chest WDL  Shoulder Blades WDL  Spine WDL  (R) Arm/Elbow/Hand WDL  (L) Arm/Elbow/Hand Abrasion and Scab  Abdomen WDL  Groin WDL  Scrotum/Coccyx/Buttocks WDL  (R) Leg WDL  (L) Leg WDL  (R) Heel/Foot/Toe WDL  (L) Heel/Foot/Toe Scab          Devices In Places Tele Box      Interventions In Place Pillows, Q2 Turns, Low Air Loss Mattress, and Pressure Redistribution Mattress    Possible Skin Injury No    Pictures Uploaded Into Epic N/A  Wound Consult Placed N/A  RN Wound Prevention Protocol Ordered Yes

## 2023-05-13 NOTE — PROGRESS NOTES
Critical Care Progress Note    Date of admission  5/12/2023    Chief Complaint  62 y.o. male admitted 5/12/2023 with CVA    Hospital Course  62 y.o. male who presented 5/12/2023 with a past medical history significant for diabetes, hypertension, and a cardiomyopathy who presented to [place] emergency department today after being found down on the floor at 3 AM with right-sided hemiplegia and aphasia.  He had a normal head CT as well as CT angio at the outside hospital and in consultation with neurology was administered TNK at 0 445 for an NIH stroke scale of 19.  His blood pressure initially was 180 and improved to 140 and his glucose was 137.  Per the transfer records, patient had recently been diagnosed with Bell's palsy on the left side 1 month ago but no history of stroke.  He is compliant with his medications.  Upon arrival, patient was able to provide additional history that he actually had sudden onset of right-sided weakness yesterday afternoon and had to crawl around on the floor until 3 AM when he could finally get to the phone to call for help.  His wife was out of town in [place].    Interval Problem Update  Reviewed last 24 hour events:   - AF, improving WBC   - sinus mehrdad, -140   - AAOx, no improvement in R sided weakness and slurred speech   - Hgb down to 12   - mild increase in BUN/crt    Review of Systems  Review of Systems   Constitutional:  Negative for chills, fever and malaise/fatigue.   HENT:  Negative for congestion and sore throat.    Eyes:  Negative for blurred vision.   Respiratory:  Negative for cough, sputum production and shortness of breath.    Cardiovascular:  Negative for chest pain, palpitations and leg swelling.   Gastrointestinal:  Negative for abdominal pain, nausea and vomiting.   Genitourinary:  Negative for dysuria.   Musculoskeletal:  Negative for back pain and myalgias.   Skin:  Negative for rash.   Neurological:  Positive for sensory change, speech change and  focal weakness. Negative for dizziness and headaches.   Endo/Heme/Allergies:  Does not bruise/bleed easily.   Psychiatric/Behavioral:  Negative for depression. The patient is not nervous/anxious.    All other systems reviewed and are negative.       Vital Signs for last 24 hours   Temp:  [36 °C (96.8 °F)-36.5 °C (97.7 °F)] 36.3 °C (97.4 °F)  Pulse:  [53-91] 61  Resp:  [10-37] 14  BP: ()/(53-95) 128/67  SpO2:  [91 %-99 %] 97 %    Respiratory Information for the last 24 hours   2 lpm n/c    Physical Exam   Physical Exam  Vitals and nursing note reviewed.   Constitutional:       General: He is awake. He is not in acute distress.     Appearance: He is well-developed and normal weight. He is not toxic-appearing.      Interventions: Nasal cannula in place.   HENT:      Head: Normocephalic and atraumatic.      Nose: Nose normal.      Mouth/Throat:      Mouth: Mucous membranes are dry.      Pharynx: Oropharynx is clear. No oropharyngeal exudate.   Eyes:      General: No scleral icterus.     Conjunctiva/sclera: Conjunctivae normal.      Pupils: Pupils are equal, round, and reactive to light.   Neck:      Vascular: No JVD.   Cardiovascular:      Rate and Rhythm: Regular rhythm. Bradycardia present. Occasional Extrasystoles are present.     Chest Wall: PMI is displaced.      Pulses: Normal pulses.      Heart sounds: Normal heart sounds. No murmur heard.  Pulmonary:      Effort: Pulmonary effort is normal. No respiratory distress.      Breath sounds: Transmitted upper airway sounds present. No stridor. Examination of the left-lower field reveals rhonchi. Rhonchi present. No wheezing.      Comments: Protecting airway, strong cough  Abdominal:      General: Bowel sounds are normal. There is no distension.      Palpations: Abdomen is soft.      Tenderness: There is no abdominal tenderness. There is no guarding or rebound.   Musculoskeletal:         General: No tenderness.      Cervical back: Neck supple. No tenderness.       Right lower leg: No edema.      Left lower leg: No edema.   Skin:     General: Skin is warm and dry.      Capillary Refill: Capillary refill takes less than 2 seconds.      Coloration: Skin is not pale.   Neurological:      Mental Status: He is alert and oriented to person, place, and time.      Cranial Nerves: Cranial nerve deficit present.      Sensory: Sensory deficit present.      Motor: Weakness present.      Comments: Persistent right facial droop and right-sided weakness, slurred speech, follows commands briskly on the left and is oriented x 4   Psychiatric:         Mood and Affect: Mood normal.         Behavior: Behavior normal. Behavior is cooperative.         Thought Content: Thought content normal.         Medications  Current Facility-Administered Medications   Medication Dose Route Frequency Provider Last Rate Last Admin    niCARdipine (CARDENE) 25 mg in  mL Standard Infusion  0-15 mg/hr Intravenous Continuous Jeremy M Gonda, M.D.   Dose not Required at 05/12/23 0915    atorvastatin (LIPITOR) tablet 80 mg  80 mg Oral Q EVENING Jeremy M Gonda, M.D.        senna-docusate (PERICOLACE or SENOKOT S) 8.6-50 MG per tablet 2 Tablet  2 Tablet Oral BID Jeremy M Gonda, M.D.        And    polyethylene glycol/lytes (MIRALAX) PACKET 1 Packet  1 Packet Oral QDAY PRN Jeremy M Gonda, M.D.        And    magnesium hydroxide (MILK OF MAGNESIA) suspension 30 mL  30 mL Oral QDAY PRN Jeremy M Gonda, M.D.        And    bisacodyl (DULCOLAX) suppository 10 mg  10 mg Rectal QDAY PRN Jeremy M Gonda, M.D.        ondansetron (ZOFRAN) syringe/vial injection 4 mg  4 mg Intravenous Q4HRS PRN Jeremy M Gonda, M.D.        ondansetron (ZOFRAN ODT) dispertab 4 mg  4 mg Oral Q4HRS PRN Jeremy M Gonda, M.D.        promethazine (PHENERGAN) tablet 12.5-25 mg  12.5-25 mg Oral Q4HRS PRN Jeremy M Gonda, M.D.        promethazine (PHENERGAN) suppository 12.5-25 mg  12.5-25 mg Rectal Q4HRS PRN Benjaminy M Gonda, M.D. prochlorperazine  (COMPAZINE) injection 5-10 mg  5-10 mg Intravenous Q4HRS PRN Jeremy M Gonda, M.D.        acetaminophen (Tylenol) tablet 650 mg  650 mg Oral Q6HRS PRN Jeremy M Gonda, M.D.        insulin regular (HumuLIN R,NovoLIN R) injection  1-6 Units Subcutaneous Q6HRS Jeremy M Gonda, M.D.   1 Units at 05/13/23 0039    And    dextrose 10 % BOLUS 25 g  25 g Intravenous Q15 MIN PRN Jeremy M Gonda, M.D.        carvedilol (COREG) tablet 25 mg  25 mg Oral BID WITH MEALS Jeremy M Gonda, M.D.        furosemide (LASIX) tablet 40 mg  40 mg Oral DAILY Jeremy M Gonda, M.D.        lisinopril (PRINIVIL) tablet 20 mg  20 mg Oral DAILY Jeremy M Gonda, M.D.        HYDROmorphone (Dilaudid) injection 0.5-1 mg  0.5-1 mg Intravenous Q3HRS PRN Janeen Brown A.P.R.N.   1 mg at 05/13/23 0000       Fluids    Intake/Output Summary (Last 24 hours) at 5/13/2023 0646  Last data filed at 5/13/2023 0600  Gross per 24 hour   Intake 0 ml   Output 425 ml   Net -425 ml       Laboratory          Recent Labs     05/12/23  1130 05/13/23  0425   SODIUM 140 142   POTASSIUM 4.1 4.3   CHLORIDE 105 108   CO2 19* 21   BUN 14 22   CREATININE 1.00 1.47*   MAGNESIUM 2.0 2.3   PHOSPHORUS 3.6  --    CALCIUM 8.9 8.6     Recent Labs     05/12/23  1130 05/13/23  0425   GLUCOSE 225* 163*     Recent Labs     05/12/23  1130 05/13/23  0425   WBC 12.5* 11.5*   NEUTSPOLYS 82.40* 76.70*   LYMPHOCYTES 11.80* 15.20*   MONOCYTES 4.60 6.50   EOSINOPHILS 0.20 0.80   BASOPHILS 0.50 0.30     Recent Labs     05/12/23  1130 05/13/23  0425   RBC 4.73 4.30*   HEMOGLOBIN 13.5* 12.1*   HEMATOCRIT 40.5* 37.2*   PLATELETCT 207 183       Imaging  X-Ray:  No film today  CT:    Reviewed  MRI:   Reviewed    Assessment/Plan  * Acute cerebrovascular accident (CVA) (HCC)- (present on admission)  Assessment & Plan  Acute ischemic stroke likely occurring 5/11 in the afternoon now s/p delayed TNK administration 5/12 at 0445  Echo pending  Strict blood pressure management with goal SBP less than 140    PT/OT/SLP evaluation  Antiplatelets and statin  Neurology consulted  Close neurologic monitoring    Type 2 diabetes mellitus without complication, without long-term current use of insulin (HCC)- (present on admission)  Assessment & Plan  With hyperglycemia  Continue insulin sliding scale for now, eventually resume NPH and glipizide when appropriate  Eventual diabetic diet when passes SLP monitoring glucose with goal between 120 and 180    Mixed hyperlipidemia- (present on admission)  Assessment & Plan  Continue high intensity statin    Essential hypertension, benign- (present on admission)  Assessment & Plan  Continue strict blood pressure goals with a systolic blood pressure less than 140  As needed hydralazine, labetalol, nicardipine  Resume outpatient Coreg, Lasix, and lisinopril doses    Dilated cardiomyopathy (HCC)- (present on admission)  Assessment & Plan  With improved ejection fraction in 2017 up to 50%  Repeat echocardiogram pending today  Resume heart failure medications including Coreg, Lasix, and lisinopril today    Pain of right hip  Assessment & Plan  From fall yesterday in the setting of stroke  X-rays of pelvis and right femur negative  As needed analgesics  PT/OT         VTE:  Lovenox  Ulcer: Not Indicated  Lines: Faust Catheter  Ongoing indication addressed    I have performed a physical exam and reviewed and updated ROS and Plan today (5/13/2023). In review of yesterday's note (5/12/2023), there are no changes except as documented above.     Discussed patient condition and risk of morbidity and/or mortality with Hospitalist, RN, RT, Therapies, Charge nurse / hot rounds, Patient, and neurology. Critical care services will sign off at this time.  Please call with any questions or concerns.    Please note that this dictation was created using voice recognition software. I have made every reasonable attempt to correct obvious errors, but there may be errors of grammar and possibly content that I  did not discover before finalizing the note.

## 2023-05-13 NOTE — CARE PLAN
The patient is Watcher - Medium risk of patient condition declining or worsening    Shift Goals  Clinical Goals: Neuro exams, SBP less 140  Patient Goals: rest  Family Goals: ottoniel    Progress made toward(s) clinical / shift goals:  Q1 neuros, SBP less then 140, PT has not complained about pain. Stroke packet given to PT @ his bedside.     Patient is not progressing towards the following goals:

## 2023-05-13 NOTE — THERAPY
"Speech Language Pathology   Flexible Endoscopic Evaluation of Swallowing (FEES)        Patient Name: Everardo Last  AGE:  62 y.o., SEX:  male  Medical Record #: [id number]  Date of Service: 5/13/2023      History of Present Illness  \"62-year-old male with a past medical history of severe CHF that had improved from 6601-6241.  Presented outside facility with severe right-sided hemiplegia and severe dysarthria.  Outside facility call our facility for transfer.  During encounter call they said the onset was 3 AM.  That the patient was in his kitchen and fell down and crawled and called the ambulance by phone.  He was given TNK at 5:45 AM.  And then transferred to our facility.  Review of the CTA head and neck at the outside facility did not reveal an LVO.  Therefore the patient was admitted to the ICU.  Upon speaking with the patient which was difficult due to his severe dysarthria patient said he fell the day prior.  Therefore the onset was not at 3 AM today but at least over a day ago.  There is also questionable history of a left-sided Bell's palsy from 2 to 3 weeks ago.\"        PMHx:\"Severe CHF that had improved from 7673-1158,  Anxiety, Back pain, Depression, Diabetes mellitus (HCC), Dilated cardiomyopathy (HCC) (April 2015), and Hypertension.\"     CT Head 5/12: \"1.  Wedge-shaped hypoattenuation in the left paramedian omid could indicate acute infarct. MRI brain could be obtained for further evaluation if clinically indicated.  2.  Mild diffuse cerebral substance loss.  3.  Moderate microangiopathic ischemic change versus demyelination or gliosis.  4.  Chronic right caudate head lacunar infarct.  5.  Chronic right maxillary sinusitis.\"     MRI-PENDING      Pertinent Information  Current Method of Nutrition: NPO until cleared by speech pathology  Dentition: Fair   Feeding Tube: None   Tracheostomy: No           Factor(s) Affecting Performance: None     Discussed the risks, benefits, and alternatives of the FEES " procedure. Patient/family acknowledged and agreed to proceed.      Assessment  Flexible Endoscopic Evaluation of Swallowing (FEES) completed at bedside today. The endoscope was passed transnasally via Right nare to evaluate the anatomy and physiology of swallowing. Pt tolerated the procedure with no apparent distress.    Anatomic Findings: WFL  Vocal Fold Motion: Bilateral movement  Secretion Management: Adequate  PO Trials: Ice Chips, Thin Liquid, Mildly Thick Liquid, Pudding, Mixed, Soft & Bite Sized      Consistency PAS Score Timing Residue Comments   Thin Liquid 5 Pre Swallow, During swallow Vallecular Residue: Mild (5%-25%)  Pyriform Sinus Residue: Mild (5%-25%)    Mildly Thick 1 N/A Vallecular Residue: Mild (5%-25%)  Pyriform Sinus Residue: Mild (5%-25%)    Pudding 1 N/A Vallecular Residue: Mild (5%-25%)  Pyriform Sinus Residue: Mild (5%-25%)    Soft & Bite Sized 1 N/A Vallecular Residue: Mild (5%-25%)  Pyriform Sinus Residue: None (0%)    Regular Solid             Mixed 1 N/A Vallecular Residue: Mild (5%-25%)  Pyriform Sinus Residue: None (0%)      Penetration-Aspiration Scale (PAS)  1     No contrast enters airway  2     Contrast enters the airway, remains above the vocal folds, and is ejected from the airway (not seen in the airway at the end of the swallow).  3     Contrast enters the airway, remains above the vocal folds, and is not ejected from the airway (is seen in the airway after the swallow).  4     Contrast enters the airway, contacts the vocal folds, and is ejected from the airway.  5     Contrast enters the airway, contacts the vocal folds, and is not ejected from the airway  6     Contrast enters the airway, crosses the plane of the vocal folds, and is ejected from the airway.  7     Contrast enters the airway, crosses the plane of the vocal folds, and is not ejected from the airway despite effort.  8     Contrast enters the airway, crosses the plane of the vocal folds, is not ejected from the  airway and there is no response to aspiration.      Oral phase:   Impaired labial seal bilaterally (left worse than right) with pt utilizing manual seal on left and tsp/cup administered on right side.   Impaired bolus control resulted in laryngeal penetration before the swallow with trials of thin liquids. Trace-mild amount of residue noted on vocal cord or webbed between cords. Sensation impaired when there was trace-min amounts in airway but sensate with significant spillage into airway.   Impaired mastication but pt utilized manual cheek press to dislodge bolus from sulcus.  Suspected piecemeal deglutition with 2-3 swallows to clear single bolus.   Suspected impaired intraoral pressure resulted in impaired use of straw.       Pharyngeal phase:   Mild reduced tongue base retraction and pharyngeal shortening resulted in trace-mild residue in vallecula, lateral channels, and pyriform sinuses with all consistencies tested. Mild vallecular residue following trials of soft solids.   Suspected laryngeal penetration during the swallow with trials of thin liquids (PAS 3 and 5).       Compensatory Strategies:  Multiple swallows and multiple liquid washes were ineffective to clear soft solids residue in vallecula.  Oral hold did not improve bolus control in 2/3 trials of thin liquids.  Thickened liquids bolus control and timeliness of swallow.      Severity Rating:  Severity Rating: ESTRELLA  ESTRELLA: Moderate      Clinical Impressions  The pt presents with a moderate oropharyngeal dysphagia suspect acute on chronic r/t hx of Bell's Palsy (oral) and acute CVA (oropharyngeal). Swallow safety is compromised and swallow efficiency is relatively intact. A modified diet is indicated at this time. SLP following to ensure diet tolerance. Pt would benefit from MBSS prior to diet upgrade given impaired laryngeal sensation.       Recommendations  Diet Consistency: Pureed and Mildly Thick Liquid diet  Medication: Crush with pudding/puree, as  "appropriate, Crush with applesauce, as appropriate  Supervision: Direct supervision during meals  Positioning: Fully upright and midline during oral intake  Strategies: Small bites/sips, Slow rate of intake  Oral Care: Q4h  Additional [facility]ation: [facility]         SLP Treatment Plan  Treatment Plan: Dysphagia Treatment, Patient/Family/Caregiver Training  SLP Frequency: 5x Per Week  Estimated Duration: Until Therapy Goals Met      Anticipated Discharge Needs  Discharge Recommendations: Recommend post-acute placement for additional speech therapy services prior to discharge home   Therapy Recommendations Upon DC: Dysphagia Training, Patient / Family / Caregiver Education       Patient / Family Goals  Patient / Family Goal #1: \"My mouth is dry\"  Goal #1 Outcome: Progressing as expected  Short Term Goal # 1: Patient will consume pre-feeding trials without overt s/s of aspiraiton.  Goal Outcome # 1: Goal met, new goal added  Short Term Goal # 1 B : Patient will consume a PU4/MT2 diet with no overt s/sx of aspiration given dir supervision.      Mamie Orozco, SLP  "

## 2023-05-13 NOTE — CARE PLAN
The patient is Watcher - Medium risk of patient condition declining or worsening    Shift Goals  Clinical Goals: q1 neuro, MRI, SBP <140  Patient Goals: rest  Family Goals: updates    Progress made toward(s) clinical / shift goals:    Problem: Knowledge Deficit - Standard  Goal: Patient and family/care givers will demonstrate understanding of plan of care, disease process/condition, diagnostic tests and medications  Description: Target End Date:  1-3 days or as soon as patient condition allows    Document in Patient Education    1.  Patient and family/caregiver oriented to unit, equipment, visitation policy and means for communicating concern  2.  Complete/review Learning Assessment  3.  Assess knowledge level of disease process/condition, treatment plan, diagnostic tests and medications  4.  Explain disease process/condition, treatment plan, diagnostic tests and medications  Outcome: Progressing     Problem: Optimal Care of the Stroke Patient  Goal: Optimal acute care for the stroke patient  Description: Target End Date:  1 to 3 days    - Vital signs and neuro checks performed and documented per order  - NIHSS completed and documented per order  - Continuous telemetry monitoring for 72 hours or until discontinued by provider  - Head CT without contrast obtained  - Consideration of MRI/MRA  - MRI screening form completed in worklist if MRI ordered  - Echocardiogram with Bubble Study ordered/completed with consideration of KATHLEEN  - Carotid Doppler ordered/completed (Not required if CTA of neck completed in ED)  - Lipid Panel obtained within 48 hours of admission  - PT, PTT, INR obtained per Anticoagulation orders (if applicable)  - Antithrombotic therapy by end of hospital day 2 for ischemic stroke. Provider must document reason if contraindicated.  - Venous Thromboembolism (VTE) Prophylaxis by end of hospital day 2 for ischemic and hemorrhagic stroke. Provider must document reason if contraindicated  - Dysphagia  screen completed and documented prior to any PO intake. Patient to remain NPO until Speech Therapy evaluation if thrombolytic or thrombectomy performed  - Rehabilitation assessment including PT/OT/SLP evaluations for referral to Physical Medicine and Rehabilitation services. If none needed, provider needs to document reason  - Neurology consult placed  - Consideration of cardiology consult for cryptogenic strokes  Outcome: Progressing     Problem: Psychosocial - Patient Condition  Goal: Patient's ability to verbalize feelings about condition will improve  Description: Target End Date:  Prior to discharge or change in level of care    1.  Discuss coping with medical condition and its effects  2.  Encourage patient participation in care  3.  Encourage acknowledgement of body changes and accompanying emotions  4.  Perform depression screening  Outcome: Progressing     Problem: Neuro Status  Goal: Neuro status will remain stable or improve  Description: Target End Date:  Prior to discharge or change in level of care    Document on Neuro assessment in the Assessment flowsheet    1.  Assess and monitor neurologic status per provider order/protocol/unit policy  2.  Assess level of consciousness and orientation  3.  Assess for speech, dysarthria, dysphagia, facial symmetry  4.  Assess visual field, eye movements, gaze preference, pupil reaction and size  5.  Assess muscle strength and motor response in all four extremities  6.  Assess for sensation (numbness and tingling)  7.  Assess basic neuro reflexes (cough, gag, corneal)  8.  Identify changes in neuro status and report to provider for testing/treatment orders  Outcome: Progressing     Problem: Hemodynamic Monitoring  Goal: Patient's hemodynamics, fluid balance and neurologic status will be stable or improve  Description: Target End Date:  Prior to discharge or change in level of care    1.  Vital signs, pulse oximetry and cardiac monitor per provider order and/or  policy  2.  Frequent pulse checks performed post thrombectomy  3.  Frequent monitoring for signs of bleeding post TPA administration  4.  Proper management of IV infusions  5.  Intake and output monitored per provider order  6.  Daily weight obtained per unit policy or provider order  7.  Peripheral pulses and capillary refill assessed as needed  8.  Monitor for signs/symptoms of excessive bleeding  9.  Body temperature assessed and fevers treated  10. Patient positioned for maximum circulation/cardiac output  Outcome: Progressing     Problem: Skin Integrity  Goal: Skin integrity is maintained or improved  Description: Target End Date:  Prior to discharge or change in level of care    Document interventions on Skin Risk/Ned flowsheet groups and corresponding LDA    1.  Assess and monitor skin integrity, appearance and/or temperature  2.  Assess risk factors for impaired skin integrity and/or pressures ulcers  3.  Implement precautions to protect skin integrity in collaboration with interdisciplinary team  4.  Implement pressure ulcer prevention protocol if at risk for skin breakdown  5.  Confirm wound care consult if at risk for skin breakdown  6.  Ensure patient use of pressure relieving devices  (Low air loss bed, waffle overlay, heel protectors, ROHO cushion, etc)  Outcome: Progressing     Problem: Pain - Standard  Goal: Alleviation of pain or a reduction in pain to the patient’s comfort goal  Description: Target End Date:  Prior to discharge or change in level of care    Document on Vitals flowsheet    1.  Document pain using the appropriate pain scale per order or unit policy  2.  Educate and implement non-pharmacologic comfort measures (i.e. relaxation, distraction, massage, cold/heat therapy, etc.)  3.  Pain management medications as ordered  4.  Reassess pain after pain med administration per policy  5.  If opiods administered assess patient's response to pain medication is appropriate per POSS sedation  scale  6.  Follow pain management plan developed in collaboration with patient and interdisciplinary team (including palliative care or pain specialists if applicable)  Outcome: Progressing

## 2023-05-13 NOTE — HOSPITAL COURSE
62 y.o. male who presented 5/12/2023 with a past medical history significant for diabetes, hypertension, and a cardiomyopathy who presented to [facility] emergency department today after being found down on the floor at 3 AM with right-sided hemiplegia and aphasia.  He had a normal head CT as well as CT angio at the outside hospital and in consultation with neurology was administered TNK at 0 445 for an NIH stroke scale of 19.  His blood pressure initially was 180 and improved to 140 and his glucose was 137.  Per the transfer records, patient had recently been diagnosed with Bell's palsy on the left side 1 month ago but no history of stroke.  He is compliant with his medications.  Upon arrival, patient was able to provide additional history that he actually had sudden onset of right-sided weakness yesterday afternoon and had to crawl around on the floor until 3 AM when he could finally get to the phone to call for help.  His wife was out of town in [place].

## 2023-05-13 NOTE — PROGRESS NOTES
Neurology Progress Note  Neurohospitalist Service, [facility]    Referring Physician: Ulysses Cook M.D.    No chief complaint on file.      HPI: Refer to initial documented Neurology H&P, as detailed in the patient's chart.    Interval History 5/13: No new events overnight.    Review of systems: In addition to what is detailed in the HPI and/or updated in the interval history, all other systems reviewed and are negative.    Past Medical History:    has a past medical history of Anxiety, Back pain, Depression, Diabetes mellitus (HCC), Dilated cardiomyopathy (HCC) (April 2015), and Hypertension.    FHx:  family history includes Heart Attack in his mother; Heart Disease in his father; Stroke in his father.    SHx:   reports that he quit smoking about 29 years ago. His smoking use included cigarettes. He has never used smokeless tobacco. He reports that he does not drink alcohol and does not use drugs.    Medications:    Current Facility-Administered Medications:     furosemide (LASIX) injection 20 mg, 20 mg, Intravenous, BID DIURETIC, Jeremy M Gonda, M.D., 20 mg at 05/13/23 0843    atorvastatin (LIPITOR) tablet 80 mg, 80 mg, Oral, Q EVENING, Jeremy M Gonda, M.D.    senna-docusate (PERICOLACE or SENOKOT S) 8.6-50 MG per tablet 2 Tablet, 2 Tablet, Oral, BID **AND** polyethylene glycol/lytes (MIRALAX) PACKET 1 Packet, 1 Packet, Oral, QDAY PRN **AND** magnesium hydroxide (MILK OF MAGNESIA) suspension 30 mL, 30 mL, Oral, QDAY PRN **AND** bisacodyl (DULCOLAX) suppository 10 mg, 10 mg, Rectal, QDAY PRN, Jeremy M Gonda, M.D.    ondansetron (ZOFRAN) syringe/vial injection 4 mg, 4 mg, Intravenous, Q4HRS PRN, Jeremy M Gonda, M.D.    ondansetron (ZOFRAN ODT) dispertab 4 mg, 4 mg, Oral, Q4HRS PRN, Jeremy M Gonda, M.D.    promethazine (PHENERGAN) tablet 12.5-25 mg, 12.5-25 mg, Oral, Q4HRS PRN, Jeremy M Gonda, M.D.    promethazine (PHENERGAN) suppository 12.5-25 mg, 12.5-25 mg, Rectal, Q4HRS PRN, Benjamin  M Gonda, M.D.    prochlorperazine (COMPAZINE) injection 5-10 mg, 5-10 mg, Intravenous, Q4HRS PRN, Jeremy M Gonda, M.D.    acetaminophen (Tylenol) tablet 650 mg, 650 mg, Oral, Q6HRS PRN, Jeremy M Gonda, M.D.    insulin regular (HumuLIN R,NovoLIN R) injection, 1-6 Units, Subcutaneous, Q6HRS, 1 Units at 05/13/23 0039 **AND** POC blood glucose manual result, , , Q6H **AND** NOTIFY MD and PharmD, , , Once **AND** Administer 20 grams of glucose (approximately 8 ounces of fruit juice) every 15 minutes PRN FSBG less than 70 mg/dL, , , PRN **AND** dextrose 10 % BOLUS 25 g, 25 g, Intravenous, Q15 MIN PRN, Jeremy M Gonda, M.D.    carvedilol (COREG) tablet 25 mg, 25 mg, Oral, BID WITH MEALS, Jeremy M Gonda, M.D.    lisinopril (PRINIVIL) tablet 20 mg, 20 mg, Oral, DAILY, Jeremy M Gonda, M.D.    HYDROmorphone (Dilaudid) injection 0.5-1 mg, 0.5-1 mg, Intravenous, Q3HRS PRN, VIANCA WuPJosephRJosephNJoseph, 1 mg at 05/13/23 0000    Physical Examination:     Vitals:    05/13/23 0500 05/13/23 0600 05/13/23 0700 05/13/23 0800   BP: 121/66 128/67 (!) 143/69 (!) 140/70   Pulse: (!) 58 61 63 (!) 58   Resp: 16 14 (!) 22 (!) 29   Temp:    36.5 °C (97.7 °F)   TempSrc:    Temporal   SpO2: 98% 97% 98% 99%   Weight:       Height:             NEUROLOGICAL EXAM:     Patient is awake and alert x4.  No signs of aphasia but patient is severely dysarthric due to the underlying bulbar weakness.  Cranials 2-12 shows a right facial droop with bulbar weakness causing dysarthria.  Pupils are equal reactive.  Extraocular movements are intact.  Visual fields intact.  Motor examination he has sustained antigravity in the left arm and leg.  Patient can move his fingers and toes only on the right.  No antigravity strength.  Sensation relatively intact on both sides.  No obvious signs ataxia.         NIH Stroke Scale:    1a. Level of Consciousness:  0    1b. LOC Questions (Month, age):  0    1c. LOC Commands (Open/close eyes make fist/let go):  0    2.   Best Gaze  (Eyes open - patient follows examiner's finger on face):  0  3.   Visual Fields (introduce visual stimulus/threat to patient's field quadrants):   0  4.   Facial Paresis (Show teeth, raise eyebrows and squeeze eyes shut):  2  5a. Motor Arm - Left (Elevate arm to 90 degrees if patient is sitting, 45 degrees if  Supine):  0  5b. Motor Arm - Right (Elevate arm to 90 degrees if patient is sitting, 45 degrees if supine): 3  6a. Motor Leg - Left (Elevate leg 30 degrees with patient supine):  0  6b. Motor Leg - Right  (Elevate leg 30 degrees with patient supine):  4  7.   Limb Ataxia (Finger-nose, heel down shin):  0    8.   Sensory (Pin prick to face, arm, trunk and leg - compare side to side):  0  9.  Best Language (Name item, describe a picture and read sentences):  0  10. Dysarthria (Evaluate speech clarity by patient repeating listed words):  2  11. Extinction and Inattention (Use information from prior testing to identify neglect or double simultaneous stimuli testing):  0    Total NIH Score:  11                  Objective Data:    Labs:  Lab Results   Component Value Date/Time    PROTHROMBTM 16.9 (H) 04/15/2015 08:58 AM    INR 1.40 (H) 04/15/2015 08:58 AM      Lab Results   Component Value Date/Time    WBC 11.5 (H) 05/13/2023 04:25 AM    RBC 4.30 (L) 05/13/2023 04:25 AM    HEMOGLOBIN 12.1 (L) 05/13/2023 04:25 AM    HEMATOCRIT 37.2 (L) 05/13/2023 04:25 AM    MCV 86.5 05/13/2023 04:25 AM    MCH 28.1 05/13/2023 04:25 AM    MCHC 32.5 (L) 05/13/2023 04:25 AM    MPV 10.0 05/13/2023 04:25 AM    NEUTSPOLYS 76.70 (H) 05/13/2023 04:25 AM    LYMPHOCYTES 15.20 (L) 05/13/2023 04:25 AM    MONOCYTES 6.50 05/13/2023 04:25 AM    EOSINOPHILS 0.80 05/13/2023 04:25 AM    BASOPHILS 0.30 05/13/2023 04:25 AM      Lab Results   Component Value Date/Time    SODIUM 142 05/13/2023 04:25 AM    POTASSIUM 4.3 05/13/2023 04:25 AM    CHLORIDE 108 05/13/2023 04:25 AM    CO2 21 05/13/2023 04:25 AM    GLUCOSE 163 (H) 05/13/2023 04:25 AM    BUN 22  05/13/2023 04:25 AM    CREATININE 1.47 (H) 05/13/2023 04:25 AM      Lab Results   Component Value Date/Time    CHOLSTRLTOT 126 05/13/2023 04:25 AM    LDL 70 05/13/2023 04:25 AM    HDL 30 (A) 05/13/2023 04:25 AM    TRIGLYCERIDE 129 05/13/2023 04:25 AM       Lab Results   Component Value Date/Time    ALKPHOSPHAT 56 04/17/2015 03:21 AM    ASTSGOT 17 04/17/2015 03:21 AM    ALTSGPT 17 04/17/2015 03:21 AM    TBILIRUBIN 0.6 04/17/2015 03:21 AM        Imaging/Testing:  OUTSIDE IMAGES-DX CHEST   Final Result      OUTSIDE IMAGES-CT HEAD   Final Result      OUTSIDE IMAGES-CT HEAD   Final Result      CT-FOREIGN FILM CAT SCAN   Final Result      DX-FEMUR-2+ RIGHT   Final Result      No acute osseous abnormality.      DX-PELVIS-1 OR 2 VIEWS   Final Result      No acute osseous abnormality.      CT-HEAD W/O   Final Result      1.  Wedge-shaped hypoattenuation in the left paramedian omid could indicate acute infarct. MRI brain could be obtained for further evaluation if clinically indicated.   2.  Mild diffuse cerebral substance loss.   3.  Moderate microangiopathic ischemic change versus demyelination or gliosis.   4.  Chronic right caudate head lacunar infarct.   5.  Chronic right maxillary sinusitis.         EC-ECHOCARDIOGRAM COMPLETE W/O CONT    (Results Pending)   MR-BRAIN-W/O    (Results Pending)         Assessment and Plan:    62-year-old male transferred on facility status post TNK.  MRI brain was done overnight official read still pending but upon my independent interpretation shows acute infarct over the left omid with no hemorrhagic conversion.  Etiology of the infarct is unknown.  LDL is controlled at 70.  Borderline diabetes with an A1c of 6.  He does have a remote history of severe cardiomyopathy with low EF that had recovered from 1542-3092.  EF went from 15% to 55% during this time.  Therefore a cardioembolic source is always possible.  Waiting on TTE today.  Okay to transfer out of the ICU.  Okay to start aspirin  today.    Plan:  1.  Okay to start aspirin 81 mg daily  2.  Continue telemetry monitoring  3.  TTE  4.  Has Lipitor 80 mg ordered given that LDL is already at 70 recommend using a lower dose such as 20 mg.  To avoid over correcting.  5.  Maintain normoglycemia  6.  Maintain normal blood pressures.  110s to 130s systolic.  No strict parameters necessary okay to initiate p.o. medications and as needed meds  per the primary team  7.  Continue with PT/OT and speech therapy    Spent a total of 58 minutes reviewing the MRI brain, going over the findings with the patient and going over the care plan with the primary team to transfer out of the ICU.        This chart was partially generated using voice recognition technology and sound alike word replacement may be present, best efforts were made to make the chart accurate.    Jarett Decker MD  Board Certified Neurology, ABPN  t) [phone]

## 2023-05-13 NOTE — CONSULTS
Physical Medicine and Rehabilitation Consultation              Date of initial consultation: 5/13/2023  Requesting provider: ordered by Jeremy M Gonda, M.D. at 05/12/23 1029   Consulting provider: Melody Hemphill D.O.  Reason for consultation: assess for acute inpatient rehab appropriateness  LOS: 1 Day(s)    Chief complaint: right sided weakness and dysarthria     HPI: The patient is a 62 y.o. male with a past medical history of CHF , DM, and HTN;  who presented on 5/12/2023  7:58 AM as a transfer from an OSH  with right sided weakness. Per documentation, patient was in his kitchen when he fell down and crawled to phone to activate EMS. Upon eval at the OSH patient had work up for likely stroke, patient was administered TNKase and transferred to [facility]. CTA head and neck obtained at the OSH did now show any LVO. Upon presentation to [facility], there was questionable information about the time line of events, which may have included 2-3 weeks of possible bells palsy and a fall possibly the day before presenting to the hospital. Neurology was consulted, recommendations for strict SBP < 140.  Patient is currently on ASA and statin. Echocardiogram pending. MRI brain showed multiple old lacunar infarcts and area of bandlike area of acute brainstem infarct in the left paramedian omid without hemorrhagic transformation. Patient has dysphagia, PT and OT evals are pending.     Patient seen and examined at bedside, patient's mother in law and daugthers in room. Patient's son on the phone during visit. Patient reports he feels ok. + R sided weakness, R hip pain. Denies coughing or SOB. Denies CP. Reviewed with patient and family plans for therapy evals likely tomorrow on Monday. Reviewed with patient and family levels of rehab. Patient's family unsure of how much help they can provide, report they would need more than 2 weeks to get their house set up, and need to determine who could provide caregiving. Family expressed  understanding the  need for post acute rehab.     Social Hx:  Per chart, patient lives with his wife in [place]. Per family, wife  the patient 10 years ago, patient is living in mobile home in [place]. Has a son that lives locally.   2 KAITLYNN  At prior level of function patient was Independent with mobility and ADLs.       Tobacco: former smoker quit approx 30 years ago  Alcohol: Denies   Drugs: Denies     THERAPY:  Restrictions: Fall Risk, Swallow Precautions   PT: Functional mobility   Pending     OT: ADLs  Pending     SLP:   5/13 pureed and mildly thick liquids     IMAGING:      Results for orders placed during the hospital encounter of 05/12/23    MR-BRAIN-W/O    Impression  1.  Old lacunar infarct right head of caudate nucleus.  2.  Old lacunar infarcts bilateral thalami.  3.  Old lacunar infarcts right frontal deep white matter along the corona radiata and right superior basal ganglia.  4.  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  5.  Multiple foci of old hypertensive microhemorrhage in both cerebral hemispheres. No acute hemorrhage.  6.  Prominent wedgelike/bandlike area of acute brainstem infarction in the left paramedian omid. No hemorrhagic transformation.  7.  Old lacunar infarct right cerebellar hemisphere.  8.  Right maxillary sinus chronic sinusitis with T2 hypointense inspissated secretions.                                            PROCEDURES:  None     PMH:  Past Medical History:   Diagnosis Date    Anxiety     Back pain     Depression     Diabetes mellitus (HCC)     Dilated cardiomyopathy (HCC) April 2015    Echocardiogram with severely dilated LV. LVEF 20-25%. Global hypokinesis. Mild MR.  Coronary angiogram with 40% stenosis of LAD. Echo 8/4/2015 EF 35-45%, Echo 4/4/2017 EF 50%     Hypertension        PSH:  Past Surgical History:  "  Procedure Laterality Date    CARDIAC CATH  04/15/2015    nonobstructive CAD, Dilated cardiomyopathy EF 15%.    APPENDECTOMY      EYE SURGERY      OTHER ORTHOPEDIC SURGERY      Left ankle surgery       FHX:  Family History   Problem Relation Age of Onset    Heart Attack Mother     Heart Disease Father     Stroke Father        Medications:  Current Facility-Administered Medications   Medication Dose    furosemide (LASIX) injection 20 mg  20 mg    atorvastatin (LIPITOR) tablet 80 mg  80 mg    senna-docusate (PERICOLACE or SENOKOT S) 8.6-50 MG per tablet 2 Tablet  2 Tablet    And    polyethylene glycol/lytes (MIRALAX) PACKET 1 Packet  1 Packet    And    magnesium hydroxide (MILK OF MAGNESIA) suspension 30 mL  30 mL    And    bisacodyl (DULCOLAX) suppository 10 mg  10 mg    ondansetron (ZOFRAN) syringe/vial injection 4 mg  4 mg    ondansetron (ZOFRAN ODT) dispertab 4 mg  4 mg    promethazine (PHENERGAN) tablet 12.5-25 mg  12.5-25 mg    promethazine (PHENERGAN) suppository 12.5-25 mg  12.5-25 mg    prochlorperazine (COMPAZINE) injection 5-10 mg  5-10 mg    acetaminophen (Tylenol) tablet 650 mg  650 mg    insulin regular (HumuLIN R,NovoLIN R) injection  1-6 Units    And    dextrose 10 % BOLUS 25 g  25 g    carvedilol (COREG) tablet 25 mg  25 mg    lisinopril (PRINIVIL) tablet 20 mg  20 mg    HYDROmorphone (Dilaudid) injection 0.5-1 mg  0.5-1 mg       Allergies:  Allergies   Allergen Reactions    Codeine Rash     Patient states he gets rash on his arm      Keflex Itching     Patient states he begins to itch all over     Statins [Hmg-Coa-R Inhibitors] Myalgia       Physical Exam:    Physical Exam:  Vitals: BP (!) 158/74   Pulse (!) 57   Temp 36.5 °C (97.7 °F) (Temporal)   Resp 15   Ht 1.702 m (5' 7\")   Wt 83.2 kg (183 lb 6.8 oz)   SpO2 98%   Gen: NAD, laying comfortably in bed, family in room   Head:  NC/AT  Eyes/ Nose/ Mouth: PERRLA, moist mucous membranes  Cardio: RRR, good distal perfusion, warm extremities  Pulm: " normal respiratory effort, no cyanosis , on RA   Abd: Soft NTND,   Ext: No peripheral edema. No calf tenderness. No clubbing.    Mental status:  A&Ox4 (person, place, date, situation) answers questions appropriately follows commands  Speech: fluent, + dysarthria    CRANIAL NERVES:  2,3: visual acuity grossly intact, PERRL  3,4,6: EOMI bilaterally, no nystagmus or diplopia  5: sensation intact to light touch bilaterally and symmetric  7: + right sided facial droop   8: hearing grossly intact    Motor:      Upper Extremity  Myotome R L   Shoulder flexion C5 0/5 5/5   Elbow flexion C5 0/5 5/5   Wrist extension C6 0/5 5/5   Elbow extension C7 0/5 5/5   Finger flexion C8 0/5 5/5   Finger abduction T1 0/5 5/5     Lower Extremity Myotome R L   Hip flexion L2 0/5 5/5   Knee extension L3 0/5 5/5   Ankle dorsiflexion L4 0/5 5/5   Toe extension L5 0/5 5/5   Ankle plantarflexion S1 0/5 5/5       Sensory:   intact to light touch through out left upper and lower extremity. Intact to Right lower extremity, diminished in right hand     DTRs: 1+ in bilateral  biceps RUE 2+ LUE   No clonus at bilateral ankles  Negative Malhotra b/l     Tone: no spasticity noted    Coordination:   intact finger to nose LUE   intact fine motor with fingers LUE       Labs: Reviewed and significant for   Recent Labs     05/12/23  1130 05/13/23  0425   RBC 4.73 4.30*   HEMOGLOBIN 13.5* 12.1*   HEMATOCRIT 40.5* 37.2*   PLATELETCT 207 183     Recent Labs     05/12/23  1130 05/13/23  0425   SODIUM 140 142   POTASSIUM 4.1 4.3   CHLORIDE 105 108   CO2 19* 21   GLUCOSE 225* 163*   BUN 14 22   CREATININE 1.00 1.47*   CALCIUM 8.9 8.6     Recent Results (from the past 24 hour(s))   Hemoglobin A1C    Collection Time: 05/12/23 10:30 AM   Result Value Ref Range    Glycohemoglobin 6.0 (H) 4.0 - 5.6 %    Est Avg Glucose 126 mg/dL   MAGNESIUM    Collection Time: 05/12/23 11:30 AM   Result Value Ref Range    Magnesium 2.0 1.5 - 2.5 mg/dL   PHOSPHORUS    Collection Time:  23 11:30 AM   Result Value Ref Range    Phosphorus 3.6 2.5 - 4.5 mg/dL   CBC WITH DIFFERENTIAL    Collection Time: 23 11:30 AM   Result Value Ref Range    WBC 12.5 (H) 4.8 - 10.8 K/uL    RBC 4.73 4.70 - 6.10 M/uL    Hemoglobin 13.5 (L) 14.0 - 18.0 g/dL    Hematocrit 40.5 (L) 42.0 - 52.0 %    MCV 85.6 81.4 - 97.8 fL    MCH 28.5 27.0 - 33.0 pg    MCHC 33.3 (L) 33.7 - 35.3 g/dL    RDW 46.7 35.9 - 50.0 fL    Platelet Count 207 164 - 446 K/uL    MPV 10.0 9.0 - 12.9 fL    Neutrophils-Polys 82.40 (H) 44.00 - 72.00 %    Lymphocytes 11.80 (L) 22.00 - 41.00 %    Monocytes 4.60 0.00 - 13.40 %    Eosinophils 0.20 0.00 - 6.90 %    Basophils 0.50 0.00 - 1.80 %    Immature Granulocytes 0.50 0.00 - 0.90 %    Nucleated RBC 0.00 /100 WBC    Neutrophils (Absolute) 10.32 (H) 1.82 - 7.42 K/uL    Lymphs (Absolute) 1.47 1.00 - 4.80 K/uL    Monos (Absolute) 0.57 0.00 - 0.85 K/uL    Eos (Absolute) 0.02 0.00 - 0.51 K/uL    Baso (Absolute) 0.06 0.00 - 0.12 K/uL    Immature Granulocytes (abs) 0.06 0.00 - 0.11 K/uL    NRBC (Absolute) 0.00 K/uL   Basic Metabolic Panel    Collection Time: 23 11:30 AM   Result Value Ref Range    Sodium 140 135 - 145 mmol/L    Potassium 4.1 3.6 - 5.5 mmol/L    Chloride 105 96 - 112 mmol/L    Co2 19 (L) 20 - 33 mmol/L    Glucose 225 (H) 65 - 99 mg/dL    Bun 14 8 - 22 mg/dL    Creatinine 1.00 0.50 - 1.40 mg/dL    Calcium 8.9 8.5 - 10.5 mg/dL    Anion Gap 16.0 7.0 - 16.0   ESTIMATED GFR    Collection Time: 23 11:30 AM   Result Value Ref Range    GFR (CKD-EPI) 85 >60 mL/min/1.73 m 2   EKG    Collection Time: 23 11:53 AM   Result Value Ref Range    Report       [facility]    Test Date:  2023  Pt Name:    KILO RIVERS RIVERS    Department: [facility]  MRN:        [id number]                      Room:       [facility]  Gender:     Male                         Technician: RAO  :        1960                   Requested By:JEREMY M GONDA  Order #:    [id number]                     Reading MD: Tyrese Arnold MD    Measurements  Intervals                                Axis  Rate:       68                           P:          42  CA:         182                          QRS:        -42  QRSD:       104                          T:          65  QT:         462  QTc:        492    Interpretive Statements  Normal sinus rhythm  Left anterior fascicular block  Lateral ST abnormalities, nonspecific  Prolonged QTc  Electronically Signed On 2023 15:59:08 PDT by Tyrese Arnold MD     POCT glucose device results    Collection Time: 23  5:21 PM   Result Value Ref Range    POC Glucose, Blood 142 (H) 65 - 99 mg/dL   TROPONIN    Collection Time: 23  7:20 PM   Result Value Ref Range    Troponin T 44 (H) 6 - 19 ng/L   EKG    Collection Time: 23  7:36 PM   Result Value Ref Range    Report       [facility]    Test Date:  2023  Pt Name:    KILO RIVERS    Department: [facility]  MRN:        [id number]                      Room:       [facility]  Gender:     Male                         Technician: MIKEL  :        1960                   Requested By:SITA CERRATO  Order #:    [id number]                    Reading MD: Josie Infante MD    Measurements  Intervals                                Axis  Rate:       79                           P:          43  CA:         170                          QRS:        -47  QRSD:       104                          T:          73  QT:         426  QTc:        489    Interpretive Statements  Sinus rhythm  Left anterior fascicular block  Abnormal R-wave progression, late transition  Probable left ventricular hypertrophy  Nonspecific T abnormalities, lateral leads  Borderline prolonged QT interval  Compared to ECG 2023 11:53:45  T-wave abnormality now present  ST (T wave) deviation no longer present  Electronically  Signed On 2023 2:21:18 PDT by Josie Infante MD     POCT glucose device results    Collection Time:  05/13/23 12:37 AM   Result Value Ref Range    POC Glucose, Blood 172 (H) 65 - 99 mg/dL   CBC with Differential    Collection Time: 05/13/23  4:25 AM   Result Value Ref Range    WBC 11.5 (H) 4.8 - 10.8 K/uL    RBC 4.30 (L) 4.70 - 6.10 M/uL    Hemoglobin 12.1 (L) 14.0 - 18.0 g/dL    Hematocrit 37.2 (L) 42.0 - 52.0 %    MCV 86.5 81.4 - 97.8 fL    MCH 28.1 27.0 - 33.0 pg    MCHC 32.5 (L) 33.7 - 35.3 g/dL    RDW 48.1 35.9 - 50.0 fL    Platelet Count 183 164 - 446 K/uL    MPV 10.0 9.0 - 12.9 fL    Neutrophils-Polys 76.70 (H) 44.00 - 72.00 %    Lymphocytes 15.20 (L) 22.00 - 41.00 %    Monocytes 6.50 0.00 - 13.40 %    Eosinophils 0.80 0.00 - 6.90 %    Basophils 0.30 0.00 - 1.80 %    Immature Granulocytes 0.50 0.00 - 0.90 %    Nucleated RBC 0.00 /100 WBC    Neutrophils (Absolute) 8.81 (H) 1.82 - 7.42 K/uL    Lymphs (Absolute) 1.75 1.00 - 4.80 K/uL    Monos (Absolute) 0.75 0.00 - 0.85 K/uL    Eos (Absolute) 0.09 0.00 - 0.51 K/uL    Baso (Absolute) 0.04 0.00 - 0.12 K/uL    Immature Granulocytes (abs) 0.06 0.00 - 0.11 K/uL    NRBC (Absolute) 0.00 K/uL   Basic Metabolic Panel (BMP)    Collection Time: 05/13/23  4:25 AM   Result Value Ref Range    Sodium 142 135 - 145 mmol/L    Potassium 4.3 3.6 - 5.5 mmol/L    Chloride 108 96 - 112 mmol/L    Co2 21 20 - 33 mmol/L    Glucose 163 (H) 65 - 99 mg/dL    Bun 22 8 - 22 mg/dL    Creatinine 1.47 (H) 0.50 - 1.40 mg/dL    Calcium 8.6 8.5 - 10.5 mg/dL    Anion Gap 13.0 7.0 - 16.0   Magnesium    Collection Time: 05/13/23  4:25 AM   Result Value Ref Range    Magnesium 2.3 1.5 - 2.5 mg/dL   Lipid Profile    Collection Time: 05/13/23  4:25 AM   Result Value Ref Range    Cholesterol,Tot 126 100 - 199 mg/dL    Triglycerides 129 0 - 149 mg/dL    HDL 30 (A) >=40 mg/dL    LDL 70 <100 mg/dL   ESTIMATED GFR    Collection Time: 05/13/23  4:25 AM   Result Value Ref Range    GFR (CKD-EPI) 53 (A) >60 mL/min/1.73 m 2   POCT glucose device results    Collection Time: 05/13/23  6:20 AM   Result Value Ref  Range    POC Glucose, Blood 132 (H) 65 - 99 mg/dL         ASSESSMENT:  Patient is a 62 y.o. male admitted with dysarthria and right sided weakness     [facility] Code / Diagnosis to Support: 0001.2 - Stroke: Right Body Involvement (Left Brain)    Rehabilitation: Impaired ADLs and mobility  Patient is anticipated to be a fair candidate for inpatient rehab based on needs for therapy, will neeed therapy evals completed and confirmation of help from family.     Barriers to transfer include: Insurance authorization, TCCs to verify disposition, medical clearance and bed availability     Additional Recommendations:  Left paramedian omid stroke  - greatest deficits are slurred speech and right sided weakness   - s/p TNK at OSH, delayed administration   - neuro consulted   - MRI brain showed multiple old lacunar infarcts and an acute left paramedian omid infarct without evidence of hemorrhagic transformation   - on ASA and statin for secondary stroke ppx   - PT/OT evals pending, continue with SLP     Dysphagia   - SLP evals completed   - failed first swallow eval  - 5/13 upgraded from NPO to puree with thins     DM   - on glipizide in outpatient setting   - currently of SSI   - BG well controlled     HTN   - on coreg, lasix, and lisinopril in outpatient setting  - lasix currently held     Hx of dilated cardiomyopathy   - Last Echo showed EF of 50% in April 2017  - updated echo pending     R hip pain   - R pelvis and femur xray negative   - Tylenol PRN     Dispo:  - patient is currently functioning below their level of baseline, recommend post acute rehab    - recommend IRF level therapy with 3hr of therapy 5 days per week IF patient's family is able to provide caregiving needs including physical assistance and ADLs (family unsure they can help at this time, but need therapy evals completed to confirm)   - piror to acceptance to IRF, will need insurance auth, therapy evals, and confirmation of DC support   - TCC to assist with  insurance auth and DC support         Medical Complexity:  Left paramedian omid stroke  Dysphagia   DM   HTN   Hx of dilated cardiomyopathy   R hip pain   Impaired mobility and ADLs        DVT PPX: lovenox       Thank you for allowing us to participate in the care of this patient.     Patient was seen for 82  minutes on unit/floor of which > 50% of time was spent on counseling and coordination of care regarding the above, including prognosis, risk reduction, benefits of treatment, and options for next stage of care.    Melody Hemphill D.O.   Physical Medicine and Rehabilitation     Please note that this dictation was created using voice recognition software. I have made every reasonable attempt to correct obvious errors, but there may be errors of grammar and possibly content that I did not discover before finalizing the note.

## 2023-05-14 ENCOUNTER — APPOINTMENT (OUTPATIENT)
Dept: CARDIOLOGY | Facility: MEDICAL CENTER | Age: 63
DRG: 065 | End: 2023-05-14
Attending: STUDENT IN AN ORGANIZED HEALTH CARE EDUCATION/TRAINING PROGRAM
Payer: MEDICARE

## 2023-05-14 LAB
ALBUMIN SERPL BCP-MCNC: 3.9 G/DL (ref 3.2–4.9)
ALBUMIN/GLOB SERPL: 1.2 G/DL
ALP SERPL-CCNC: 73 U/L (ref 30–99)
ALT SERPL-CCNC: 14 U/L (ref 2–50)
ANION GAP SERPL CALC-SCNC: 12 MMOL/L (ref 7–16)
AST SERPL-CCNC: 15 U/L (ref 12–45)
BASOPHILS # BLD AUTO: 0.5 % (ref 0–1.8)
BASOPHILS # BLD: 0.05 K/UL (ref 0–0.12)
BILIRUB SERPL-MCNC: 0.6 MG/DL (ref 0.1–1.5)
BUN SERPL-MCNC: 27 MG/DL (ref 8–22)
CALCIUM ALBUM COR SERPL-MCNC: 9.1 MG/DL (ref 8.5–10.5)
CALCIUM SERPL-MCNC: 9 MG/DL (ref 8.5–10.5)
CHLORIDE SERPL-SCNC: 108 MMOL/L (ref 96–112)
CO2 SERPL-SCNC: 24 MMOL/L (ref 20–33)
CREAT SERPL-MCNC: 1.23 MG/DL (ref 0.5–1.4)
EOSINOPHIL # BLD AUTO: 0.24 K/UL (ref 0–0.51)
EOSINOPHIL NFR BLD: 2.5 % (ref 0–6.9)
ERYTHROCYTE [DISTWIDTH] IN BLOOD BY AUTOMATED COUNT: 50.2 FL (ref 35.9–50)
GFR SERPLBLD CREATININE-BSD FMLA CKD-EPI: 66 ML/MIN/1.73 M 2
GLOBULIN SER CALC-MCNC: 3.2 G/DL (ref 1.9–3.5)
GLUCOSE BLD STRIP.AUTO-MCNC: 155 MG/DL (ref 65–99)
GLUCOSE BLD STRIP.AUTO-MCNC: 163 MG/DL (ref 65–99)
GLUCOSE BLD STRIP.AUTO-MCNC: 167 MG/DL (ref 65–99)
GLUCOSE BLD STRIP.AUTO-MCNC: 172 MG/DL (ref 65–99)
GLUCOSE BLD STRIP.AUTO-MCNC: 217 MG/DL (ref 65–99)
GLUCOSE SERPL-MCNC: 159 MG/DL (ref 65–99)
HCT VFR BLD AUTO: 40.6 % (ref 42–52)
HGB BLD-MCNC: 13 G/DL (ref 14–18)
IMM GRANULOCYTES # BLD AUTO: 0.04 K/UL (ref 0–0.11)
IMM GRANULOCYTES NFR BLD AUTO: 0.4 % (ref 0–0.9)
LV EJECT FRACT  99904: 45
LV EJECT FRACT MOD 2C 99903: 46.53
LV EJECT FRACT MOD 4C 99902: 45.35
LV EJECT FRACT MOD BP 99901: 44.95
LYMPHOCYTES # BLD AUTO: 1.74 K/UL (ref 1–4.8)
LYMPHOCYTES NFR BLD: 18.4 % (ref 22–41)
MAGNESIUM SERPL-MCNC: 2.3 MG/DL (ref 1.5–2.5)
MCH RBC QN AUTO: 28.6 PG (ref 27–33)
MCHC RBC AUTO-ENTMCNC: 32 G/DL (ref 33.7–35.3)
MCV RBC AUTO: 89.2 FL (ref 81.4–97.8)
MONOCYTES # BLD AUTO: 0.7 K/UL (ref 0–0.85)
MONOCYTES NFR BLD AUTO: 7.4 % (ref 0–13.4)
NEUTROPHILS # BLD AUTO: 6.7 K/UL (ref 1.82–7.42)
NEUTROPHILS NFR BLD: 70.8 % (ref 44–72)
NRBC # BLD AUTO: 0 K/UL
NRBC BLD-RTO: 0 /100 WBC
PHOSPHATE SERPL-MCNC: 3.3 MG/DL (ref 2.5–4.5)
PLATELET # BLD AUTO: 154 K/UL (ref 164–446)
PMV BLD AUTO: 9.8 FL (ref 9–12.9)
POTASSIUM SERPL-SCNC: 3.9 MMOL/L (ref 3.6–5.5)
PROT SERPL-MCNC: 7.1 G/DL (ref 6–8.2)
RBC # BLD AUTO: 4.55 M/UL (ref 4.7–6.1)
SODIUM SERPL-SCNC: 144 MMOL/L (ref 135–145)
WBC # BLD AUTO: 9.5 K/UL (ref 4.8–10.8)

## 2023-05-14 PROCEDURE — 93306 TTE W/DOPPLER COMPLETE: CPT

## 2023-05-14 PROCEDURE — 700102 HCHG RX REV CODE 250 W/ 637 OVERRIDE(OP): Performed by: INTERNAL MEDICINE

## 2023-05-14 PROCEDURE — 97162 PT EVAL MOD COMPLEX 30 MIN: CPT

## 2023-05-14 PROCEDURE — 36415 COLL VENOUS BLD VENIPUNCTURE: CPT

## 2023-05-14 PROCEDURE — 700117 HCHG RX CONTRAST REV CODE 255: Performed by: STUDENT IN AN ORGANIZED HEALTH CARE EDUCATION/TRAINING PROGRAM

## 2023-05-14 PROCEDURE — 93306 TTE W/DOPPLER COMPLETE: CPT | Mod: 26 | Performed by: INTERNAL MEDICINE

## 2023-05-14 PROCEDURE — 99232 SBSQ HOSP IP/OBS MODERATE 35: CPT | Performed by: STUDENT IN AN ORGANIZED HEALTH CARE EDUCATION/TRAINING PROGRAM

## 2023-05-14 PROCEDURE — 80053 COMPREHEN METABOLIC PANEL: CPT

## 2023-05-14 PROCEDURE — 84100 ASSAY OF PHOSPHORUS: CPT

## 2023-05-14 PROCEDURE — 97167 OT EVAL HIGH COMPLEX 60 MIN: CPT

## 2023-05-14 PROCEDURE — 700102 HCHG RX REV CODE 250 W/ 637 OVERRIDE(OP): Performed by: HOSPITALIST

## 2023-05-14 PROCEDURE — 700102 HCHG RX REV CODE 250 W/ 637 OVERRIDE(OP): Performed by: PSYCHIATRY & NEUROLOGY

## 2023-05-14 PROCEDURE — A9270 NON-COVERED ITEM OR SERVICE: HCPCS | Performed by: HOSPITALIST

## 2023-05-14 PROCEDURE — 770020 HCHG ROOM/CARE - TELE (206)

## 2023-05-14 PROCEDURE — 85025 COMPLETE CBC W/AUTO DIFF WBC: CPT

## 2023-05-14 PROCEDURE — A9270 NON-COVERED ITEM OR SERVICE: HCPCS | Performed by: PSYCHIATRY & NEUROLOGY

## 2023-05-14 PROCEDURE — 700102 HCHG RX REV CODE 250 W/ 637 OVERRIDE(OP): Performed by: STUDENT IN AN ORGANIZED HEALTH CARE EDUCATION/TRAINING PROGRAM

## 2023-05-14 PROCEDURE — A9270 NON-COVERED ITEM OR SERVICE: HCPCS | Performed by: STUDENT IN AN ORGANIZED HEALTH CARE EDUCATION/TRAINING PROGRAM

## 2023-05-14 PROCEDURE — 83735 ASSAY OF MAGNESIUM: CPT

## 2023-05-14 PROCEDURE — A9270 NON-COVERED ITEM OR SERVICE: HCPCS | Performed by: INTERNAL MEDICINE

## 2023-05-14 PROCEDURE — 700111 HCHG RX REV CODE 636 W/ 250 OVERRIDE (IP): Performed by: INTERNAL MEDICINE

## 2023-05-14 PROCEDURE — 82962 GLUCOSE BLOOD TEST: CPT

## 2023-05-14 RX ORDER — CARBOXYMETHYLCELLULOSE SODIUM 5 MG/ML
1 SOLUTION/ DROPS OPHTHALMIC PRN
Status: DISCONTINUED | OUTPATIENT
Start: 2023-05-14 | End: 2023-05-16 | Stop reason: HOSPADM

## 2023-05-14 RX ORDER — HYDRALAZINE HYDROCHLORIDE 20 MG/ML
20 INJECTION INTRAMUSCULAR; INTRAVENOUS EVERY 6 HOURS PRN
Status: DISCONTINUED | OUTPATIENT
Start: 2023-05-14 | End: 2023-05-16 | Stop reason: HOSPADM

## 2023-05-14 RX ORDER — AMLODIPINE BESYLATE 5 MG/1
5 TABLET ORAL
Status: DISCONTINUED | OUTPATIENT
Start: 2023-05-14 | End: 2023-05-16 | Stop reason: HOSPADM

## 2023-05-14 RX ADMIN — FUROSEMIDE 20 MG: 10 INJECTION, SOLUTION INTRAVENOUS at 05:16

## 2023-05-14 RX ADMIN — SENNOSIDES AND DOCUSATE SODIUM 2 TABLET: 50; 8.6 TABLET ORAL at 05:16

## 2023-05-14 RX ADMIN — FUROSEMIDE 20 MG: 10 INJECTION, SOLUTION INTRAVENOUS at 16:26

## 2023-05-14 RX ADMIN — HYDRALAZINE HYDROCHLORIDE 25 MG: 25 TABLET, FILM COATED ORAL at 00:38

## 2023-05-14 RX ADMIN — ENOXAPARIN SODIUM 40 MG: 100 INJECTION SUBCUTANEOUS at 16:31

## 2023-05-14 RX ADMIN — CARVEDILOL 25 MG: 6.25 TABLET, FILM COATED ORAL at 08:30

## 2023-05-14 RX ADMIN — AMLODIPINE BESYLATE 5 MG: 5 TABLET ORAL at 16:30

## 2023-05-14 RX ADMIN — CARVEDILOL 25 MG: 6.25 TABLET, FILM COATED ORAL at 16:30

## 2023-05-14 RX ADMIN — HUMAN ALBUMIN MICROSPHERES AND PERFLUTREN 3 ML: 10; .22 INJECTION, SOLUTION INTRAVENOUS at 13:36

## 2023-05-14 RX ADMIN — CARBOXYMETHYLCELLULOSE SODIUM 1 DROP: 5 SOLUTION/ DROPS OPHTHALMIC at 11:11

## 2023-05-14 RX ADMIN — INSULIN HUMAN 1 UNITS: 100 INJECTION, SOLUTION PARENTERAL at 00:37

## 2023-05-14 RX ADMIN — SENNOSIDES AND DOCUSATE SODIUM 2 TABLET: 50; 8.6 TABLET ORAL at 16:30

## 2023-05-14 RX ADMIN — ACETAMINOPHEN 650 MG: 325 TABLET, FILM COATED ORAL at 20:20

## 2023-05-14 RX ADMIN — ATORVASTATIN CALCIUM 40 MG: 40 TABLET, FILM COATED ORAL at 17:42

## 2023-05-14 RX ADMIN — HYDRALAZINE HYDROCHLORIDE 25 MG: 25 TABLET, FILM COATED ORAL at 07:21

## 2023-05-14 RX ADMIN — ACETAMINOPHEN 650 MG: 325 TABLET, FILM COATED ORAL at 11:11

## 2023-05-14 RX ADMIN — ASPIRIN 81 MG 81 MG: 81 TABLET ORAL at 05:16

## 2023-05-14 ASSESSMENT — ENCOUNTER SYMPTOMS
MYALGIAS: 1
CARDIOVASCULAR NEGATIVE: 1
FOCAL WEAKNESS: 1
PSYCHIATRIC NEGATIVE: 1
RESPIRATORY NEGATIVE: 1
EYES NEGATIVE: 1
WEAKNESS: 1
GASTROINTESTINAL NEGATIVE: 1
SPEECH CHANGE: 1

## 2023-05-14 ASSESSMENT — COGNITIVE AND FUNCTIONAL STATUS - GENERAL
PERSONAL GROOMING: A LOT
MOVING TO AND FROM BED TO CHAIR: UNABLE
TOILETING: A LOT
HELP NEEDED FOR BATHING: A LOT
MOBILITY SCORE: 6
DRESSING REGULAR UPPER BODY CLOTHING: A LOT
DRESSING REGULAR LOWER BODY CLOTHING: A LOT
DAILY ACTIVITIY SCORE: 11
MOVING FROM LYING ON BACK TO SITTING ON SIDE OF FLAT BED: UNABLE
SUGGESTED CMS G CODE MODIFIER MOBILITY: CN
SUGGESTED CMS G CODE MODIFIER DAILY ACTIVITY: CL
TURNING FROM BACK TO SIDE WHILE IN FLAT BAD: UNABLE
EATING MEALS: TOTAL
CLIMB 3 TO 5 STEPS WITH RAILING: TOTAL
STANDING UP FROM CHAIR USING ARMS: TOTAL
WALKING IN HOSPITAL ROOM: TOTAL

## 2023-05-14 ASSESSMENT — PAIN DESCRIPTION - PAIN TYPE
TYPE: ACUTE PAIN
TYPE: ACUTE PAIN

## 2023-05-14 ASSESSMENT — GAIT ASSESSMENTS: GAIT LEVEL OF ASSIST: UNABLE TO PARTICIPATE

## 2023-05-14 ASSESSMENT — ACTIVITIES OF DAILY LIVING (ADL): TOILETING: INDEPENDENT

## 2023-05-14 NOTE — CONSULTS
Hospital Medicine Consultation    Date of Service  5/13/2023    Referring Physician  Jeremy Gonda, M.D.    Consulting Physician  Ulysses Cook M.D.    Reason for Consultation  Hospital medicine consultation requested in a patient admitted with acute CVA    History of Presenting Illness  62 y.o. male who presented 5/12/2023 with history of diabetes type 2, hypertension, dyslipidemia, history of cardiomyopathy with unknown follow-up, possible prior cardiomyopathy, resides in [facility], was brought to [place] emergency department after being found down on the floor at approximately 3 AM in the morning, right-sided hemiplegia and aphasia, at the facility the patient had a normal head CT, negative CT angiogram, remote consultation obtained from neurology and the patient felt appropriate for TNK administration which was given at 0445 a.m., NIH stroke scale 19, the patient was initially hypertensive with a blood pressure of 180 and proven to 140, blood sugar was 137, reportedly the patient was recently diagnosed with Bell's palsy on the left, denied previous history of CVA, reportedly takes medication for blood pressure, baby aspirin, diabetic regimen and insulin NPH.  Reportedly compliant,  The patient was brought to this facility after thrombolytics for close observation, especially care neurologic consultation.  The patient was identified to have significant right-sided weakness, right upper extremity, right lower extremity, flaccid, dysarthria, right facial droop, and chart review the patient had a previous ejection fraction several years ago down to 15%, which did not subsequently improved to 50%.  The patient did have a coronary angiogram in 2015 where the patient was found to have nonobstructive coronary disease, at that time ejection fraction of 50% was diagnosed, it is possibly history of sleep apnea  No documented prior dysrhythmia as per chart review.  The patient is seen in ICU, he is  stabilized and is able to transfer out of ICU at this time.  A follow-up echocardiogram is currently pending, the MRI showed a left acute infarction of the omid.  Prior infarction noted, multiple areas.  Laboratory data indicates some leukocytosis 11.5, hemoglobin 12.1, platelet count 183, creatinine 1.47, glucose 163, LDL at 70, troponin level in the indeterminate range, a glycohemoglobin  A1c 6.0  The patient currently denies headache, he continues to have a fairly dense right hemiplegia, right facial droop and dysarthria  Neurology is consulted and is following  Review of Systems  Review of Systems   Constitutional:  Positive for malaise/fatigue.   HENT: Negative.     Eyes: Negative.    Respiratory: Negative.  Negative for shortness of breath.    Cardiovascular: Negative.  Negative for chest pain and palpitations.   Gastrointestinal: Negative.    Genitourinary: Negative.    Musculoskeletal:  Positive for myalgias.   Skin: Negative.    Neurological:  Positive for speech change, focal weakness and weakness. Negative for dizziness and headaches.   Endo/Heme/Allergies: Negative.    Psychiatric/Behavioral:  The patient is nervous/anxious.    All other systems reviewed and are negative.      Past Medical History   has a past medical history of Anxiety, Back pain, Depression, Diabetes mellitus (HCC), Dilated cardiomyopathy (HCC) (April 2015), and Hypertension.  Negative coronary angiography in 2015  No documented dysrhythmia    Surgical History   has a past surgical history that includes eye surgery; other orthopedic surgery; appendectomy; and [facility] cardiac cath (04/15/2015).    Family History  family history includes Heart Attack in his mother; Heart Disease in his father; Stroke in his father.    Social History   reports that he quit smoking about 29 years ago. His smoking use included cigarettes. He has never used smokeless tobacco. He reports that he does not drink alcohol and does not use drugs.    Medications  Prior  to Admission Medications   Prescriptions Last Dose Informant Patient Reported? Taking?   NOVOLIN N RELION 100 UNIT/ML Suspension 5/12/2023 at am Patient Yes No   Sig: Inject 30 Units under the skin 2 times a day.   aspirin (ASA) 81 MG CHEW chewable tablet 5/12/2023 at am Patient Yes No   Sig: Take 81 mg by mouth every day.   carvedilol (COREG) 25 MG Tab 5/11/2023 at am Patient No No   Sig: Take 1 Tab by mouth 2 Times a Day.   furosemide (LASIX) 40 MG Tab 5/11/2023 at am Patient No No   Sig: Take 1 Tab by mouth every day.   glipiZIDE (GLUCOTROL) 10 MG Tab 5/11/2023 at am Patient Yes No   Sig: Take 10 mg by mouth 2 times a day.   lisinopril (PRINIVIL) 20 MG Tab 5/11/2023 at am Patient Yes Yes   Sig: Take 20 mg by mouth every day.      Facility-Administered Medications: None       Allergies  Allergies   Allergen Reactions    Codeine Rash     Patient states he gets rash on his arm      Keflex Itching     Patient states he begins to itch all over     Statins [Hmg-Coa-R Inhibitors] Myalgia       Physical Exam  Temp:  [35.9 °C (96.6 °F)-36.9 °C (98.5 °F)] 35.9 °C (96.6 °F)  Pulse:  [54-80] 63  Resp:  [10-29] 16  BP: (109-158)/(58-77) 130/71  SpO2:  [88 %-99 %] 88 %    Physical Exam  Vitals and nursing note reviewed.   Constitutional:       Appearance: He is well-developed. He is ill-appearing.      Comments: Pt seen and examined.   HENT:      Head: Normocephalic and atraumatic.      Comments: Right facial droop     Mouth/Throat:      Mouth: Mucous membranes are moist.   Eyes:      Pupils: Pupils are equal, round, and reactive to light.   Cardiovascular:      Rate and Rhythm: Normal rate and regular rhythm.      Heart sounds: Normal heart sounds.   Pulmonary:      Effort: Pulmonary effort is normal.      Breath sounds: Rhonchi present.   Abdominal:      General: Bowel sounds are normal.      Palpations: Abdomen is soft.   Musculoskeletal:         General: Normal range of motion.      Cervical back: Normal range of motion  and neck supple.   Skin:     General: Skin is warm and dry.   Neurological:      Mental Status: He is alert and oriented to person, place, and time.      Cranial Nerves: Cranial nerve deficit present.      Motor: Weakness present.      Coordination: Coordination abnormal.      Comments: Right upper extremity flaccid  Right lower extremity flaccid  Right facial droop  Left upper extremity 5 out of 5, left lower extremity 5 out of 5   Psychiatric:         Behavior: Behavior normal.         Fluids  Date 05/13/23 0700 - 05/14/23 0659   Shift 8211-0532 6964-7400 6825-6729 24 Hour Total   INTAKE   Shift Total       OUTPUT   Urine 475   475   Shift Total 475   475   Weight (kg) 83.2 83.2 83.2 83.2       Laboratory  Recent Labs     05/12/23  1130 05/13/23  0425   WBC 12.5* 11.5*   RBC 4.73 4.30*   HEMOGLOBIN 13.5* 12.1*   HEMATOCRIT 40.5* 37.2*   MCV 85.6 86.5   MCH 28.5 28.1   MCHC 33.3* 32.5*   RDW 46.7 48.1   PLATELETCT 207 183   MPV 10.0 10.0     Recent Labs     05/12/23  1130 05/13/23  0425   SODIUM 140 142   POTASSIUM 4.1 4.3   CHLORIDE 105 108   CO2 19* 21   GLUCOSE 225* 163*   BUN 14 22   CREATININE 1.00 1.47*   CALCIUM 8.9 8.6              Recent Labs     05/13/23  0425   TRIGLYCERIDE 129   HDL 30*   LDL 70        Imaging  MR-BRAIN-W/O   Final Result      1.  Old lacunar infarct right head of caudate nucleus.   2.  Old lacunar infarcts bilateral thalami.   3.  Old lacunar infarcts right frontal deep white matter along the corona radiata and right superior basal ganglia.   4.  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   5.  Multiple foci of old hypertensive microhemorrhage in both cerebral hemispheres. No acute hemorrhage.   6.  Prominent wedgelike/bandlike area of acute brainstem infarction in the left paramedian omid. No hemorrhagic transformation.   7.  Old  lacunar infarct right cerebellar hemisphere.   8.  Right maxillary sinus chronic sinusitis with T2 hypointense inspissated secretions.      OUTSIDE IMAGES-DX CHEST   Final Result      OUTSIDE IMAGES-CT HEAD   Final Result      OUTSIDE IMAGES-CT HEAD   Final Result      CT-FOREIGN FILM CAT SCAN   Final Result      DX-FEMUR-2+ RIGHT   Final Result      No acute osseous abnormality.      DX-PELVIS-1 OR 2 VIEWS   Final Result      No acute osseous abnormality.      CT-HEAD W/O   Final Result      1.  Wedge-shaped hypoattenuation in the left paramedian omid could indicate acute infarct. MRI brain could be obtained for further evaluation if clinically indicated.   2.  Mild diffuse cerebral substance loss.   3.  Moderate microangiopathic ischemic change versus demyelination or gliosis.   4.  Chronic right caudate head lacunar infarct.   5.  Chronic right maxillary sinusitis.         EC-ECHOCARDIOGRAM COMPLETE W/O CONT    (Results Pending)       Assessment/Plan  * Acute cerebrovascular accident (CVA) (HCC)- (present on admission)  Assessment & Plan  Acute ischemic stroke likely occurring 5/11 in the afternoon now s/p delayed TNK administration 5/12 at 0445  Echo pending, prior dilated cardiomyopathy  Strict blood pressure management with goal SBP less than 140   PT/OT/SLP evaluation, referral to rehabilitation, inpatient rehabilitation appropriate likely  Antiplatelets and statin  Neurology consulted  Close neurologic monitoring  Aspiration precaution, seizure precaution, fall precaution    Dysarthria  Assessment & Plan  Secondary to CVA    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 (HCC)- (present on admission)  Assessment & Plan  Stroke treatment, PT OT SLP    Type 2 diabetes mellitus without complication, without long-term current use of insulin (HCC)- (present on admission)  Assessment & Plan  With hyperglycemia, his hemoglobin A1c of 6.0 indicates good control  Continue insulin  sliding scale for now, eventually resume NPH and glipizide when appropriate  Eventual diabetic diet when passes SLP monitoring glucose with goal between 120 and 180    Essential hypertension, benign- (present on admission)  Assessment & Plan  Continue strict blood pressure goals with a systolic blood pressure less than 140  As needed hydralazine, labetalol, nicardipine  Resume outpatient Coreg, Lasix, and lisinopril doses    Dilated cardiomyopathy (HCC)- (present on admission)  Assessment & Plan  With improved ejection fraction in 2017 up to 50%, previously followed by cardiology  Repeat echocardiogram pending  Resume heart failure medications including Coreg, Lasix, and lisinopril today  Prior coronary angiogram in 2015 negative for low limiting stenosis  Did have a 40% LAD lesion    Coronary artery disease involving native coronary artery of native heart without angina pectoris- (present on admission)  Assessment & Plan  Negative coronary angiogram in 2015  Await echocardiogram  Continue medical regimen    Multiple lacunar infarcts (HCC)- (present on admission)  Assessment & Plan  As noted on MRI  Follow-up echocardiogram  Maximize medical therapy  Rule out cardiac arrhythmia    Pain of right hip- (present on admission)  Assessment & Plan  From fall yesterday in the setting of stroke  X-rays of pelvis and right femur negative  As needed analgesics  PT/OT    Mixed hyperlipidemia- (present on admission)  Assessment & Plan  Continue statin, his current LDL is at 70, arguably at goal, reduce statin dosage      Plan  Continue aspirin  Reduce Lipitor  Continue current treatment for blood pressure, heart failure presumed until echocardiogram is further available  Insulin regimen  Patient was cleared for a modified diet, closely monitor for aspiration  Fall precaution  Seizure precaution  Blood pressure control  PT OT SLP  The patient appears good candidate for the acute rehab facility, PMR consult  See orders  Medically  complex high-risk patient    Thank you for consulting with us, we will follow closely while the patient is hospitalized    My total time spent caring for the patient on the day of the encounter was 56 minutes.   This does not include time spent on separately billable procedures/tests.    Please note that this dictation was created using voice recognition software. I have made every reasonable attempt to correct obvious errors, but I expect that there are errors of grammar and possibly context that I did not discover before finalizing the note.

## 2023-05-14 NOTE — ASSESSMENT & PLAN NOTE
With improved ejection fraction in 2017 up to 50%, previously followed by cardiology  Repeat echocardiogram pending  Resume heart failure medications including Coreg, Lasix, and lisinopril today  Prior coronary angiogram in 2015 negative for low limiting stenosis  Did have a 40% LAD lesion

## 2023-05-14 NOTE — ASSESSMENT & PLAN NOTE
With hyperglycemia, his hemoglobin A1c of 6.0 indicates good control  Continue insulin sliding scale for now, eventually resume NPH and glipizide when appropriate  Eventual diabetic diet when passes SLP monitoring glucose with goal between 120 and 180

## 2023-05-14 NOTE — ASSESSMENT & PLAN NOTE
Acute ischemic stroke likely occurring 5/11 in the afternoon now s/p delayed TNK administration 5/12 at 0445  Echo pending, prior dilated cardiomyopathy  Strict blood pressure management with goal SBP less than 140   PT/OT/SLP evaluation, referral to rehabilitation, inpatient rehabilitation appropriate likely  Antiplatelets and statin  Neurology consulted  Close neurologic monitoring  Aspiration precaution, seizure precaution, fall precaution

## 2023-05-14 NOTE — CARE PLAN
The patient is Stable - Low risk of patient condition declining or worsening    Shift Goals  Clinical Goals: Q2 neuros, SBP less 140  Patient Goals: rest  Family Goals: updates      Problem: Dysphagia  Goal: Dysphagia will improve  Description: Target End Date:  Prior to discharge or change in level of care    1.  Assess and monitor ability to swallow  2.  Collaborate with Speech Therapy to determine appropriate adaptation for safe administration of medications and oral nutrition  3.  Elevate head of bed to 90 degrees during feedings and for 30 minutes after each feeding  4.  Encourage proper swallowing techniques  5.  Screening on admission or as soon as possible  Outcome: Progressing  Note: Patient now on an oral diet     Problem: Urinary Elimination  Goal: Establish and maintain regular urinary output  Description: Target End Date:  Prior to discharge or change in level of care    Document on I/O and Assessment flowsheets    1.  Evaluate need to continue indwelling catheter every shift  2.  Assess signs and symptoms of urinary retention  3.  Assess post-void residual volumes  4.  Implement bladder training program  5.  Encourage scheduled voidings  6.  Assist patient to sit on bedside commode or toilet for voiding  7.  Educate patient and family/caregiver on use and purpose of urine collection devices (document in Patient Education)  Outcome: Progressing  Note: Patient urinating via urinal

## 2023-05-14 NOTE — CARE PLAN
The patient is Stable - Low risk of patient condition declining or worsening    Shift Goals  Clinical Goals: neuro monitoring  Patient Goals: Sleep  Family Goals: ELIZABETH    Progress made toward(s) clinical / shift goals:    Problem: Knowledge Deficit - Stroke Education  Goal: Patient's knowledge of stroke and risk factors will improve  Outcome: Progressing  Note: Stroke education at bedside. Patient educated on relevant risk factors such as DM II and how to mitigate those risk factors.      Problem: Neuro Status  Goal: Neuro status will remain stable or improve  Outcome: Progressing  Note: Q 4 neuro checks and NIH in use. Patient A&O x 4, RUE/RLE 1/5 strength, responds to commands. ELANA/LLE 4/5. Numbness and tingling present on the right side.       Problem: Hemodynamic Monitoring  Goal: Patient's hemodynamics, fluid balance and neurologic status will be stable or improve  Outcome: Progressing  Note: Systolic BP goal < 140.      Problem: Dysphagia  Goal: Dysphagia will improve  Outcome: Progressing  Note: Level 4 pureed diet with level 2 mildly thick liquids in use.      Problem: Fall Risk  Goal: Patient will remain free from falls  Outcome: Progressing  Note: Fall precautions in place. Bed locked and in lowest position. Proper signage and wristband in use. Call light within reach.        Patient is not progressing towards the following goals: N/A

## 2023-05-14 NOTE — PROGRESS NOTES
Monitor Summary: SR 63-84, SD .0.18, QRS .0.10, QT .0.44 with rare PVCs per strip from monitor room

## 2023-05-14 NOTE — THERAPY
Occupational Therapy   Initial Evaluation     Patient Name: Everardo Last  Age:  62 y.o., Sex:  male  Medical Record #: [id number]  Today's Date: 5/14/2023     Precautions  Precautions: Fall Risk, Swallow Precautions    Assessment  Patient is 62 y.o. male with a diagnosis of R side weakness.  Pt currently limited by decreased functional mobility, activity tolerance, cognition, strength, AROM, coordination, balance, and pain which are affecting pt's ability to complete ADLs/IADLs at baseline. Pt would benefit from OT services in the acute care setting to maximize functional recovery.        Plan    Occupational Therapy Initial Treatment Plan   Treatment Interventions: (P) Self Care / Activities of Daily Living, Therapeutic Activity, Neuro Re-Education / Balance  Treatment Frequency: (P) 4 Times per Week  Duration: (P) Until Therapy Goals Met       Discharge Recommendations: (P) Recommend post-acute placement for additional occupational therapy services prior to discharge home        05/14/23 1014   Prior Living Situation   Housing / Facility 1 Story House   Equipment Owned None   Lives with - Patient's Self Care Capacity Other (Comments)  (ex wife)   Prior Level of ADL Function   Self Feeding Independent   Grooming / Hygiene Independent   Bathing Independent   Dressing Independent   Toileting Independent   ADL Assessment   Grooming Moderate Assist   Upper Body Dressing Maximal Assist   Lower Body Dressing Maximal Assist   Toileting Maximal Assist   Functional Mobility   Sit to Stand Maximal Assist  (x2)   Bed, Chair, Wheelchair Transfer Unable to Participate   Short Term Goals   Short Term Goal # 1 supervised with UB dressing   Short Term Goal # 2 min A with LB dressing   Short Term Goal # 3 mod A with ADL txfs   Occupational Therapy Initial Treatment Plan    Treatment Interventions Self Care / Activities of Daily Living;Therapeutic Activity;Neuro Re-Education / Balance   Treatment Frequency 4 Times per Week    Duration Until Therapy Goals Met   Anticipated Discharge Equipment and Recommendations   Discharge Recommendations Recommend post-acute placement for additional occupational therapy services prior to discharge home

## 2023-05-14 NOTE — PROGRESS NOTES
[facility] Medicine Daily Progress Note    Date of Service  5/14/2023    Chief Complaint  Everardo Last is a 62 y.o. male admitted 5/12/2023 with acute CVA    Hospital Course  62 y.o. male who presented 5/12/2023 with history of diabetes type 2, hypertension, dyslipidemia, history of cardiomyopathy with unknown follow-up, possible prior cardiomyopathy, resides in [facility], was brought to [facility] emergency department after being found down on the floor at approximately 3 AM in the morning, right-sided hemiplegia and aphasia, at the facility the patient had a normal head CT, negative CT angiogram, remote consultation obtained from neurology and the patient felt appropriate for TNK administration which was given at 0445 a.m., NIH stroke scale 19, the patient was initially hypertensive with a blood pressure of 180 and proven to 140, blood sugar was 137, reportedly the patient was recently diagnosed with Bell's palsy on the left, denied previous history of CVA, reportedly takes medication for blood pressure, baby aspirin, diabetic regimen and insulin NPH.  Reportedly compliant,  The patient was brought to this facility after thrombolytics for close observation, especially care neurologic consultation.  The patient was identified to have significant right-sided weakness, right upper extremity, right lower extremity, flaccid, dysarthria, right facial droop, and chart review the patient had a previous ejection fraction several years ago down to 15%, which did not subsequently improved to 50%.  The patient did have a coronary angiogram in 2015 where the patient was found to have nonobstructive coronary disease, at that time ejection fraction of 50% was diagnosed, it is possibly history of sleep apnea  No documented prior dysrhythmia as per chart review.  The patient is seen in ICU, he is stabilized and is able to transfer out of ICU at this time.  A follow-up echocardiogram is currently pending, the MRI  showed a left acute infarction of the omid.  Prior infarction noted, multiple areas.  Laboratory data indicates some leukocytosis 11.5, hemoglobin 12.1, platelet count 183, creatinine 1.47, glucose 163, LDL at 70, troponin level in the indeterminate range, a glycohemoglobin  A1c 6.0  The patient currently denies headache, he continues to have a fairly dense right hemiplegia, right facial droop and dysarthria  Neurology is consulted and is [id number] y.o. male who presented 5/12/2023 with history of diabetes type 2, hypertension, dyslipidemia, history of cardiomyopathy with unknown follow-up, possible prior cardiomyopathy, resides in [facility], was brought to [facility] emergency department after being found down on the floor at approximately 3 AM in the morning, right-sided hemiplegia and aphasia, at the facility the patient had a normal head CT, negative CT angiogram, remote consultation obtained from neurology and the patient felt appropriate for TNK administration which was given at 0445 a.m., NIH stroke scale 19, the patient was initially hypertensive with a blood pressure of 180 and proven to 140, blood sugar was 137, reportedly the patient was recently diagnosed with Bell's palsy on the left, denied previous history of CVA, reportedly takes medication for blood pressure, baby aspirin, diabetic regimen and insulin NPH.  Reportedly compliant,  The patient was brought to this facility after thrombolytics for close observation, especially care neurologic consultation.  The patient was identified to have significant right-sided weakness, right upper extremity, right lower extremity, flaccid, dysarthria, right facial droop, and chart review the patient had a previous ejection fraction several years ago down to 15%, which did not subsequently improved to 50%.  The patient did have a coronary angiogram in 2015 where the patient was found to have nonobstructive coronary disease, at that time ejection  fraction of 50% was diagnosed, it is possibly history of sleep apnea  No documented prior dysrhythmia as per chart review.    The patient is seen in ICU, he is stabilized and is able to transfer out of ICU at this time.  A follow-up echocardiogram is currently pending, the MRI showed a left acute infarction of the omid.  Prior infarction noted, multiple areas.  Laboratory data indicates some leukocytosis 11.5, hemoglobin 12.1, platelet count 183, creatinine 1.47, glucose 163, LDL at 70, troponin level in the indeterminate range, a glycohemoglobin  A1c 6.0  The patient currently denies headache, he continues to have a fairly dense right hemiplegia, right facial droop and dysarthria  Neurology is consulted and is following    Interval Problem Update  Patient was evaluated at bedside  Patient in no acute distress this morning, sitting in bed  SBP in the 140s-160s  On 2 L nasal cannula  Normocytic anemia stable  No AGAPITO today  5/14 echocardiogram with ejection fraction 45%, global hypokinesis with regional variation  Start amlodipine  Continue diuresis with Lasix  Frequent incentive spirometer  PT and OT evaluated and recommending postacute care  Placed referral for SNF  PMR evaluated patient on 5/13 and pending insurance authorization    I have discussed this patient's plan of care and discharge plan at IDT rounds today with Case Management, Nursing, Nursing leadership, and other members of the IDT team.    Consultants/Specialty  Critical care medicine  Neurology  Physiatry    Code Status  Full Code    Disposition  The patient is not medically cleared for discharge to home or a post-acute facility.  Anticipate discharge to: an inpatient rehabilitation hospital    I have placed the appropriate orders for post-discharge needs.    Review of Systems  Review of Systems   Constitutional:  Positive for malaise/fatigue.   HENT: Negative.     Eyes: Negative.    Respiratory: Negative.     Cardiovascular: Negative.     Gastrointestinal: Negative.    Genitourinary: Negative.    Musculoskeletal:  Positive for myalgias.   Skin: Negative.    Neurological:  Positive for speech change, focal weakness and weakness.   Endo/Heme/Allergies: Negative.    Psychiatric/Behavioral: Negative.          Physical Exam  Temp:  [36.1 °C (96.9 °F)-36.6 °C (97.9 °F)] 36.6 °C (97.8 °F)  Pulse:  [60-72] 69  Resp:  [16-17] 17  BP: (129-168)/(74-91) 149/74  SpO2:  [92 %-97 %] 97 %    Physical Exam  Constitutional:       General: He is not in acute distress.     Appearance: Normal appearance.   HENT:      Head: Normocephalic and atraumatic.      Comments: Right facial droop     Nose: Nose normal. No congestion.      Mouth/Throat:      Mouth: Mucous membranes are moist.   Eyes:      Extraocular Movements: Extraocular movements intact.      Pupils: Pupils are equal, round, and reactive to light.   Cardiovascular:      Rate and Rhythm: Normal rate and regular rhythm.      Pulses: Normal pulses.      Heart sounds: Normal heart sounds.   Pulmonary:      Effort: Pulmonary effort is normal.      Breath sounds: Normal breath sounds.   Abdominal:      General: Bowel sounds are normal.      Palpations: Abdomen is soft.      Tenderness: There is no abdominal tenderness.   Musculoskeletal:         General: No swelling. Normal range of motion.      Cervical back: Normal range of motion and neck supple.   Skin:     General: Skin is warm.      Coloration: Skin is not jaundiced.   Neurological:      Mental Status: He is alert.      Cranial Nerves: Cranial nerve deficit present.      Motor: Weakness present.      Comments: Right upper extremity flaccid  Right lower extremity flaccid  Right facial droop  Left upper extremity 5 out of 5, left lower extremity 5 out of 5   Psychiatric:         Behavior: Behavior normal.             Right facial droop    Fluids    Intake/Output Summary (Last 24 hours) at 5/14/2023 5139  Last data filed at 5/14/2023 0800  Gross per 24 hour    Intake --   Output 490 ml   Net -490 ml       Laboratory  Recent Labs     05/12/23  1130 05/13/23  0425 05/14/23  0630   WBC 12.5* 11.5* 9.5   RBC 4.73 4.30* 4.55*   HEMOGLOBIN 13.5* 12.1* 13.0*   HEMATOCRIT 40.5* 37.2* 40.6*   MCV 85.6 86.5 89.2   MCH 28.5 28.1 28.6   MCHC 33.3* 32.5* 32.0*   RDW 46.7 48.1 50.2*   PLATELETCT 207 183 154*   MPV 10.0 10.0 9.8     Recent Labs     05/12/23  1130 05/13/23  0425 05/14/23  0630   SODIUM 140 142 144   POTASSIUM 4.1 4.3 3.9   CHLORIDE 105 108 108   CO2 19* 21 24   GLUCOSE 225* 163* 159*   BUN 14 22 27*   CREATININE 1.00 1.47* 1.23   CALCIUM 8.9 8.6 9.0             Recent Labs     05/13/23  0425   TRIGLYCERIDE 129   HDL 30*   LDL 70       Imaging  EC-ECHOCARDIOGRAM COMPLETE W/ CONT   Final Result      MR-BRAIN-W/O   Final Result      1.  Old lacunar infarct right head of caudate nucleus.   2.  Old lacunar infarcts bilateral thalami.   3.  Old lacunar infarcts right frontal deep white matter along the corona radiata and right superior basal ganglia.   4.  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   5.  Multiple foci of old hypertensive microhemorrhage in both cerebral hemispheres. No acute hemorrhage.   6.  Prominent wedgelike/bandlike area of acute brainstem infarction in the left paramedian omid. No hemorrhagic transformation.   7.  Old lacunar infarct right cerebellar hemisphere.   8.  Right maxillary sinus chronic sinusitis with T2 hypointense inspissated secretions.      OUTSIDE IMAGES-DX CHEST   Final Result      OUTSIDE IMAGES-CT HEAD   Final Result      OUTSIDE IMAGES-CT HEAD   Final Result      CT-FOREIGN FILM CAT SCAN   Final Result      DX-FEMUR-2+ RIGHT   Final Result      No acute osseous abnormality.      DX-PELVIS-1 OR 2 VIEWS   Final Result      No acute osseous abnormality.      CT-HEAD W/O   Final Result       1.  Wedge-shaped hypoattenuation in the left paramedian omid could indicate acute infarct. MRI brain could be obtained for further evaluation if clinically indicated.   2.  Mild diffuse cerebral substance loss.   3.  Moderate microangiopathic ischemic change versus demyelination or gliosis.   4.  Chronic right caudate head lacunar infarct.   5.  Chronic right maxillary sinusitis.              Assessment/Plan  * Acute cerebrovascular accident (CVA) (HCC)- (present on admission)  Assessment & Plan  Acute ischemic stroke likely occurring 5/11 in the afternoon now s/p delayed TNK administration 5/12 at 0445  Echo pending, prior dilated cardiomyopathy  Strict blood pressure management with goal SBP less than 140   PT/OT/SLP evaluation, referral to rehabilitation, inpatient rehabilitation appropriate likely  Antiplatelets and statin  Neurology consulted  Close neurologic monitoring  Aspiration precaution, seizure precaution, fall precaution    Dysarthria  Assessment & Plan  Secondary to CVA    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 (HCC)- (present on admission)  Assessment & Plan  Stroke treatment, PT OT SLP    Multiple lacunar infarcts (HCC)- (present on admission)  Assessment & Plan  As noted on MRI  Follow-up echocardiogram  Maximize medical therapy  Rule out cardiac arrhythmia    Pain of right hip- (present on admission)  Assessment & Plan  From fall yesterday in the setting of stroke  X-rays of pelvis and right femur negative  As needed analgesics  PT/OT    Type 2 diabetes mellitus without complication, without long-term current use of insulin (HCC)- (present on admission)  Assessment & Plan  With hyperglycemia, his hemoglobin A1c of 6.0 indicates good control  Continue insulin sliding scale for now, eventually resume NPH and glipizide when appropriate  Eventual diabetic diet when passes SLP monitoring glucose with goal between 120 and 180    Mixed hyperlipidemia-  (present on admission)  Assessment & Plan  Continue statin, his current LDL is at 70, arguably at goal, reduce statin dosage    Coronary artery disease involving native coronary artery of native heart without angina pectoris- (present on admission)  Assessment & Plan  Negative coronary angiogram in 2015  Await echocardiogram  Continue medical regimen    Essential hypertension, benign- (present on admission)  Assessment & Plan  Continue strict blood pressure goals with a systolic blood pressure less than 140  As needed hydralazine, labetalol, nicardipine  Resume outpatient Coreg, Lasix, and lisinopril doses    Dilated cardiomyopathy (HCC)- (present on admission)  Assessment & Plan  With improved ejection fraction in 2017 up to 50%, previously followed by cardiology  Repeat echocardiogram pending  Resume heart failure medications including Coreg, Lasix, and lisinopril today  Prior coronary angiogram in 2015 negative for low limiting stenosis  Did have a 40% LAD lesion         VTE prophylaxis: SCDs/TEDs and heparin ppx    I have performed a physical exam and reviewed and updated ROS and Plan today (5/14/2023). In review of yesterday's note (5/13/2023), there are no changes except as documented above.

## 2023-05-14 NOTE — THERAPY
Physical Therapy   Initial Evaluation     Patient Name: Everardo Last  Age:  62 y.o., Sex:  male  Medical Record #: [id number]  Today's Date: 5/14/2023       Precautions: Fall Risk;Swallow Precautions;slight sublux R shoulder    Assessment  Patient is a 62 y.o. male admitted from OSH with R hemiplegia, MRI showing multiple lacunar infarcts. Pt seen for PT evaluation at this time. Pt motivated to participate. Absent muscle activation RUE, RLE. Pt able to maintain balance EOB for brief periods with cueing for improved posture, upright head, to return midline. Tends to lean to the R. Max A for STS with c/o incr RLE pain and pt fatigues quickly. PT will follow. Recommend placement.     Plan  Treatment Plan : Bed Mobility, Gait Training, Neuro Re-Education / Balance, Self Care / Home Evaluation, Stair Training, Therapeutic Activities, Therapeutic Exercise  Treatment Frequency: 5 Times per Week  Duration: Until Therapy Goals Met    DC Equipment Recommendations: Unable to determine at this time  Discharge Recommendations: Recommend post-acute placement for additional physical therapy services prior to discharge home        05/14/23 1006   Prior Living Situation   Housing / Facility [facility]   Steps Into Home reports FOS to enter   Steps In Home reports a few steps down to a basement   Lives with -  Other (Comments)   Comments reports lives with ex-wife, though she was out of town when pt fell and was on the floor for 12 hours   Prior Level of Functional Mobility   Bed Mobility Independent   Transfer Status Independent   Ambulation Independent   Ambulation Distance community distances   Assistive Devices Used None   Stairs Independent   Cognition    Speech/ Communication Dysarthric   Level of Consciousness Alert   Attention Impaired   Comments pleasant and cooperative, oriented. difficult to understand at times. does seem to have some difficulty understanding therapist talking about CLOF and present, and pt continues  "to talk about when he \"fell on a flashlight\" and was on the ground for 12 hours.   Active ROM Lower Body    Comments no volitional movement RLE   Strength Lower Body   Comments no muscle activation RLE   Sensation Lower Body   Comments able to identify touch B, reports RLE slightly diminished   Balance Assessment   Sitting Balance (Static) Fair -   Sitting Balance (Dynamic) Poor +   Standing Balance (Static) Trace   Standing Balance (Dynamic) Dependent   Weight Shift Sitting Fair   Weight Shift Standing Poor   Comments lateral lean R in sitting, able to correct and maintain balance for intermittent periods of time, fwd head and trunk flexion but corrects with VCs   Bed Mobility    Supine to Sit Maximal Assist   Sit to Supine Maximal Assist   Scooting Maximal Assist   Gait Analysis   Gait Level Of Assist Unable to Participate   Comments RLE flaccid   Functional Mobility   Sit to Stand Maximal Assist   Bed, Chair, Wheelchair Transfer Unable to Participate   Comments needs R knee block and max A for lift off and stability in standing, keeps trunk flexed fwd standing and c/o RLE pain   Short Term Goals    Short Term Goal # 1 pt will perform supine <> sit with min A in 6 visits for improved independence   Short Term Goal # 2 pt will roll R/L with min A in 6 visits for improved independence   Short Term Goal # 3 pt will sit EOB 5 min with fair balance in 6 visits to participate in functional tasks   Short Term Goal # 4 pt will perform STS with mod A in 6 visits for improved independence   Short Term Goal # 5 pt will perform SPT to chair with mod A in 6 visits for improved OOB mobility       "

## 2023-05-15 ENCOUNTER — HOSPITAL ENCOUNTER (INPATIENT)
Facility: REHABILITATION | Age: 63
End: 2023-05-15
Attending: PHYSICAL MEDICINE & REHABILITATION | Admitting: PHYSICAL MEDICINE & REHABILITATION
Payer: MEDICARE

## 2023-05-15 LAB
ANION GAP SERPL CALC-SCNC: 12 MMOL/L (ref 7–16)
BASOPHILS # BLD AUTO: 0.5 % (ref 0–1.8)
BASOPHILS # BLD: 0.04 K/UL (ref 0–0.12)
BUN SERPL-MCNC: 33 MG/DL (ref 8–22)
CALCIUM SERPL-MCNC: 9.2 MG/DL (ref 8.5–10.5)
CHLORIDE SERPL-SCNC: 105 MMOL/L (ref 96–112)
CO2 SERPL-SCNC: 28 MMOL/L (ref 20–33)
CREAT SERPL-MCNC: 1.37 MG/DL (ref 0.5–1.4)
EOSINOPHIL # BLD AUTO: 0.28 K/UL (ref 0–0.51)
EOSINOPHIL NFR BLD: 3.8 % (ref 0–6.9)
ERYTHROCYTE [DISTWIDTH] IN BLOOD BY AUTOMATED COUNT: 50.4 FL (ref 35.9–50)
GFR SERPLBLD CREATININE-BSD FMLA CKD-EPI: 58 ML/MIN/1.73 M 2
GLUCOSE BLD STRIP.AUTO-MCNC: 144 MG/DL (ref 65–99)
GLUCOSE BLD STRIP.AUTO-MCNC: 165 MG/DL (ref 65–99)
GLUCOSE BLD STRIP.AUTO-MCNC: 176 MG/DL (ref 65–99)
GLUCOSE BLD STRIP.AUTO-MCNC: 196 MG/DL (ref 65–99)
GLUCOSE SERPL-MCNC: 184 MG/DL (ref 65–99)
HCT VFR BLD AUTO: 38.5 % (ref 42–52)
HGB BLD-MCNC: 12.5 G/DL (ref 14–18)
IMM GRANULOCYTES # BLD AUTO: 0.02 K/UL (ref 0–0.11)
IMM GRANULOCYTES NFR BLD AUTO: 0.3 % (ref 0–0.9)
LYMPHOCYTES # BLD AUTO: 1.72 K/UL (ref 1–4.8)
LYMPHOCYTES NFR BLD: 23.4 % (ref 22–41)
MAGNESIUM SERPL-MCNC: 2.4 MG/DL (ref 1.5–2.5)
MCH RBC QN AUTO: 28.9 PG (ref 27–33)
MCHC RBC AUTO-ENTMCNC: 32.5 G/DL (ref 33.7–35.3)
MCV RBC AUTO: 89.1 FL (ref 81.4–97.8)
MONOCYTES # BLD AUTO: 0.6 K/UL (ref 0–0.85)
MONOCYTES NFR BLD AUTO: 8.2 % (ref 0–13.4)
NEUTROPHILS # BLD AUTO: 4.7 K/UL (ref 1.82–7.42)
NEUTROPHILS NFR BLD: 63.8 % (ref 44–72)
NRBC # BLD AUTO: 0 K/UL
NRBC BLD-RTO: 0 /100 WBC
PLATELET # BLD AUTO: 160 K/UL (ref 164–446)
PMV BLD AUTO: 9.9 FL (ref 9–12.9)
POTASSIUM SERPL-SCNC: 4 MMOL/L (ref 3.6–5.5)
RBC # BLD AUTO: 4.32 M/UL (ref 4.7–6.1)
SODIUM SERPL-SCNC: 145 MMOL/L (ref 135–145)
WBC # BLD AUTO: 7.4 K/UL (ref 4.8–10.8)

## 2023-05-15 PROCEDURE — 36415 COLL VENOUS BLD VENIPUNCTURE: CPT

## 2023-05-15 PROCEDURE — 700102 HCHG RX REV CODE 250 W/ 637 OVERRIDE(OP): Performed by: HOSPITALIST

## 2023-05-15 PROCEDURE — A9270 NON-COVERED ITEM OR SERVICE: HCPCS | Performed by: PSYCHIATRY & NEUROLOGY

## 2023-05-15 PROCEDURE — 770020 HCHG ROOM/CARE - TELE (206)

## 2023-05-15 PROCEDURE — 700102 HCHG RX REV CODE 250 W/ 637 OVERRIDE(OP): Performed by: PSYCHIATRY & NEUROLOGY

## 2023-05-15 PROCEDURE — A9270 NON-COVERED ITEM OR SERVICE: HCPCS | Performed by: HOSPITALIST

## 2023-05-15 PROCEDURE — 83735 ASSAY OF MAGNESIUM: CPT

## 2023-05-15 PROCEDURE — 80048 BASIC METABOLIC PNL TOTAL CA: CPT

## 2023-05-15 PROCEDURE — 700102 HCHG RX REV CODE 250 W/ 637 OVERRIDE(OP): Performed by: INTERNAL MEDICINE

## 2023-05-15 PROCEDURE — 99232 SBSQ HOSP IP/OBS MODERATE 35: CPT | Performed by: STUDENT IN AN ORGANIZED HEALTH CARE EDUCATION/TRAINING PROGRAM

## 2023-05-15 PROCEDURE — 82962 GLUCOSE BLOOD TEST: CPT | Mod: 91

## 2023-05-15 PROCEDURE — A9270 NON-COVERED ITEM OR SERVICE: HCPCS | Performed by: INTERNAL MEDICINE

## 2023-05-15 PROCEDURE — 700111 HCHG RX REV CODE 636 W/ 250 OVERRIDE (IP): Performed by: INTERNAL MEDICINE

## 2023-05-15 PROCEDURE — 700111 HCHG RX REV CODE 636 W/ 250 OVERRIDE (IP): Performed by: NURSE PRACTITIONER

## 2023-05-15 PROCEDURE — 85025 COMPLETE CBC W/AUTO DIFF WBC: CPT

## 2023-05-15 RX ORDER — HYDROMORPHONE HYDROCHLORIDE 1 MG/ML
1 INJECTION, SOLUTION INTRAMUSCULAR; INTRAVENOUS; SUBCUTANEOUS
Status: CANCELLED | OUTPATIENT
Start: 2023-05-15

## 2023-05-15 RX ORDER — FUROSEMIDE 20 MG/1
20 TABLET ORAL
Status: DISCONTINUED | OUTPATIENT
Start: 2023-05-15 | End: 2023-05-16 | Stop reason: HOSPADM

## 2023-05-15 RX ORDER — HYDROMORPHONE HYDROCHLORIDE 1 MG/ML
0.5 INJECTION, SOLUTION INTRAMUSCULAR; INTRAVENOUS; SUBCUTANEOUS
Status: CANCELLED | OUTPATIENT
Start: 2023-05-15

## 2023-05-15 RX ADMIN — HYDROMORPHONE HYDROCHLORIDE 1 MG: 1 INJECTION, SOLUTION INTRAMUSCULAR; INTRAVENOUS; SUBCUTANEOUS at 02:20

## 2023-05-15 RX ADMIN — ACETAMINOPHEN 650 MG: 325 TABLET, FILM COATED ORAL at 12:06

## 2023-05-15 RX ADMIN — CARVEDILOL 25 MG: 6.25 TABLET, FILM COATED ORAL at 17:33

## 2023-05-15 RX ADMIN — FUROSEMIDE 20 MG: 10 INJECTION, SOLUTION INTRAVENOUS at 15:54

## 2023-05-15 RX ADMIN — SENNOSIDES AND DOCUSATE SODIUM 2 TABLET: 50; 8.6 TABLET ORAL at 17:34

## 2023-05-15 RX ADMIN — ASPIRIN 81 MG 81 MG: 81 TABLET ORAL at 05:57

## 2023-05-15 RX ADMIN — ENOXAPARIN SODIUM 40 MG: 100 INJECTION SUBCUTANEOUS at 17:33

## 2023-05-15 RX ADMIN — SENNOSIDES AND DOCUSATE SODIUM 2 TABLET: 50; 8.6 TABLET ORAL at 05:57

## 2023-05-15 RX ADMIN — ATORVASTATIN CALCIUM 40 MG: 40 TABLET, FILM COATED ORAL at 17:34

## 2023-05-15 ASSESSMENT — PAIN DESCRIPTION - PAIN TYPE
TYPE: ACUTE PAIN

## 2023-05-15 ASSESSMENT — ENCOUNTER SYMPTOMS
FOCAL WEAKNESS: 1
GASTROINTESTINAL NEGATIVE: 1
CARDIOVASCULAR NEGATIVE: 1
RESPIRATORY NEGATIVE: 1
EYES NEGATIVE: 1
SPEECH CHANGE: 1
WEAKNESS: 1
PSYCHIATRIC NEGATIVE: 1
MYALGIAS: 1

## 2023-05-15 NOTE — DISCHARGE PLANNING
Received Choice form @: 1547  Agency/Facility Name: [facility]   Referral sent per Choice form @: 1551    Received Choice form @: 1237  Agency/Facility Name: [facility]   Referral sent per Choice form @: 8706

## 2023-05-15 NOTE — DISCHARGE PLANNING
Case Management Discharge Planning    Admission Date: 5/12/2023  GMLOS: 2.9  ALOS: 3    6-Clicks ADL Score: 11  6-Clicks Mobility Score: 6  PT and/or OT Eval ordered: Yes  Post-acute Referrals Ordered: Yes  Post-acute Choice Obtained: Yes  Has referral(s) been sent to post-acute provider:  No      Anticipated Discharge Dispo: Discharge Disposition: Disch to  rehab facility or distinct part unit (62)  Discharge Address: TBD    DME Needed: No    Action(s) Taken:  Case discussed in IDT & DC rounds; Per Dr. Sneha Fitzgerald, patient is medically cleared for DC to IRF or SNF.   Patient evaluated by physiatrist @ [facility] who recommended IRF if family is able to provide DC support.    CM met at bedside with patient to complete assessment and discuss DCP.  Patient, Amada at time of interview, reported going back in forth between two houses (his ex-wife/Loyda  or his son/Giancarlo and his girlfriend) over the past year.   He stated that address [facility] ([address] [place] [id number]) is mailing address however, could not provide physical address.   Prior to admission patient was independent with ADLs and ambulating w/o assistive devices.  He denies any DME use and was not active with [facility].   Patient is on disability & receives between $900-1000/month.       Patient stated that he would like his ex-wife, Loyda, and NOT his son, Giancarlo, to be involved in decisions making.  No DPOA or Living will on file; Advanced Directive brochure provided to patient.    DCP to IRF or SNF discussed with patient who is agreeable to either one and selected as follows:    IRF:  1-[facility], 2-[facility]  SNF:  [facility] & [facility]    Choice Forms faxed to [facility].    Escalations Completed: None    Medically Clear: Yes    Next Steps: CM will f/u on IRF & SNF referrals statuses    Barriers to Discharge: Pending Placement    Is the patient up for discharge tomorrow:  Potentially        Care Transition Team  Assessment    Information Source  Orientation Level: Oriented X4  Information Given By: Patient  Who is responsible for making decisions for patient? : Patient    Readmission Evaluation  Is this a readmission?: No    Elopement Risk  Legal Hold: No  Ambulatory or Self Mobile in Wheelchair: No-Not an Elopement Risk  Elopement Risk: Not at Risk for Elopement    Interdisciplinary Discharge Planning  Lives with - Patient's Self Care Capacity: Other (Comments) (ex wife)  Patient or legal guardian wants to designate a caregiver: No  Support Systems: Children, Family Member(s), Friends / Neighbors  Housing / Facility: [address] [facility]  Prior Services: Other (Comments)  Durable Medical Equipment: Not Applicable    Discharge Preparedness  What is your plan after discharge?: Other (comment) (IRF vs SNF)  What are your discharge supports?: Other (comment) (Ex-wife, son)  Prior Functional Level: Independent with Activities of Daily Living, Ambulatory    Functional Assesment  Prior Functional Level: Independent with Activities of Daily Living, Ambulatory    Finances  Financial Barriers to Discharge: No  Prescription Coverage: Yes    Vision / Hearing Impairment  Vision Impairment : No  Hearing Impairment : No     Advance Directive  Advance Directive?: None  Advance Directive offered?: AD Booklet given    Domestic Abuse  Have you ever been the victim of abuse or violence?: No  Physical Abuse or Sexual Abuse: No  Verbal Abuse or Emotional Abuse: No  Possible Abuse/Neglect Reported to:: Not Applicable    Psychological Assessment  History of Substance Abuse: Methamphetamine  Date Last Used - Methamphetamine: 1987  History of Psychiatric Problems: Yes (Anxiety)  Non-compliant with Treatment: No  Newly Diagnosed Illness: Yes    Discharge Risks or Barriers  Discharge risks or barriers?: Complex medical needs  Patient risk factors: Complex medical needs    Anticipated Discharge Information  Discharge Disposition: Disch to  rehab facility  or distinct part unit (62)  Discharge Address: TBD

## 2023-05-15 NOTE — CARE PLAN
The patient is Watcher - Medium risk of patient condition declining or worsening    Shift Goals  Clinical Goals: Monitor neuro status, maintain skin intergrity  Patient Goals: Sleep  Family Goals: ELIZABETH    Progress made toward(s) clinical / shift goals:  Patients blood pressure better throughout the shift. Patient continues to have dysarthria, right sided weakness. Q2hour turns in place. Patient in on an ICU low airloss bed.      Patient is not progressing towards the following goals:

## 2023-05-15 NOTE — CARE PLAN
The patient is Watcher - Medium risk of patient condition declining or worsening    Shift Goals  Clinical Goals: Monitor neuro status, maintain skin integrity, pain management  Patient Goals: pain control, BM, sleep  Family Goals: Bed bath    Progress made toward(s) clinical / shift goals:  Patient is AAOx4, patient struggles to convey feelings or needs at times. Patient having a hard time adapting to weakness on right side. Emotional support provided. Education on safety and proper body positioning provided. Patient demonstrates understanding of education provided. Bed low, locked and call light in reach.    Problem: Optimal Care of the Stroke Patient  Goal: Optimal acute care for the stroke patient  Outcome: Progressing   Core measures in place  Problem: Knowledge Deficit - Stroke Education  Goal: Patient's knowledge of stroke and risk factors will improve  Outcome: Progressing   Patient understands stroke diagnosis  Problem: Neuro Status  Goal: Neuro status will remain stable or improve  Outcome: Progressing   Q4 neuro checks and NIH in place to monitor for neuro changes  Problem: Skin Integrity  Goal: Skin integrity is maintained or improved  Outcome: Progressing   Q2 hour turns in place with OLIVA mattress and TAPs system    Patient is not progressing towards the following goals:    Problem: Psychosocial - Patient Condition  Goal: Patient's ability to verbalize feelings about condition will improve  Outcome: Not Progressing  Goal: Patient's ability to re-evaluate and adapt role responsibilities will improve  Outcome: Not Progressing  Patient has a flat effect and at times struggle to voice feelings, will continue to provided emotional support   Problem: Bowel Elimination  Goal: Establish and maintain regular bowel function  Outcome: Not Progressing  Patient given bowel meds but has not had a significant bowel movement, patient had a small one, will escalate as necessary  , no complaints of constipation

## 2023-05-15 NOTE — PROGRESS NOTES
Hospital Medicine Daily Progress Note    Date of Service  5/15/2023    Chief Complaint  Everardo Last is a 62 y.o. male admitted 5/12/2023 with acute CVA    Hospital Course  62 y.o. male with history of diabetes type 2, hypertension, dyslipidemia, history of cardiomyopathy with unknown follow-up, possible prior cardiomyopathy, was transferred from outside facility on 5/12/2023 when he presented after found down and noted for right-sided hemiplegia and aphasia ultimately receiving thrombolytics.  Patient was admitted to the ICU for postthrombolytic care.  Neurology following for which Brain MRI showed left acute infarct of the omid. 5/14 echocardiogram with ejection fraction 45%, global hypokinesis with regional variation.  Patient to continue with aspirin and atorvastatin as per neurology.  Hospital course complicated by acute hypoxia for which patient was started on diuresis with Lasix.  He was evaluated by physiatry who recommended inpatient rehab.      Interval Problem Update  Patient was evaluated at bedside  Patient in no acute distress this morning, sitting in bed  SBP in the 130s  On 2 L nasal cannula  No AGAPITO today  Continue diuresis with Lasix  Continue monitoring labs while on intravenous diuresis  PMR recommending inpatient rehab    Patient is medically clear  Pending inpatient rehab set up    I have discussed this patient's plan of care and discharge plan at IDT rounds today with Case Management, Nursing, Nursing leadership, and other members of the IDT team.    Consultants/Specialty  Critical care medicine  Neurology  Physiatry    Code Status  Full Code    Disposition  Medically Cleared  I have placed the appropriate orders for post-discharge needs.    Review of Systems  Review of Systems   Constitutional:  Positive for malaise/fatigue.   HENT: Negative.     Eyes: Negative.    Respiratory: Negative.     Cardiovascular: Negative.    Gastrointestinal: Negative.    Genitourinary: Negative.     Musculoskeletal:  Positive for myalgias.   Skin: Negative.    Neurological:  Positive for speech change, focal weakness and weakness.   Endo/Heme/Allergies: Negative.    Psychiatric/Behavioral: Negative.          Physical Exam  Temp:  [36.1 °C (97 °F)-36.8 °C (98.3 °F)] 36.3 °C (97.4 °F)  Pulse:  [60-65] 65  Resp:  [15-18] 17  BP: ()/(61-79) 161/79  SpO2:  [95 %-97 %] 96 %    Physical Exam  Constitutional:       General: He is not in acute distress.     Appearance: Normal appearance.   HENT:      Head: Normocephalic and atraumatic.      Comments: Right facial droop     Nose: Nose normal. No congestion.      Mouth/Throat:      Mouth: Mucous membranes are moist.   Eyes:      Extraocular Movements: Extraocular movements intact.      Pupils: Pupils are equal, round, and reactive to light.   Cardiovascular:      Rate and Rhythm: Normal rate and regular rhythm.      Pulses: Normal pulses.      Heart sounds: Normal heart sounds.   Pulmonary:      Effort: Pulmonary effort is normal.      Breath sounds: Normal breath sounds.   Abdominal:      General: Bowel sounds are normal.      Palpations: Abdomen is soft.      Tenderness: There is no abdominal tenderness.   Musculoskeletal:         General: No swelling. Normal range of motion.      Cervical back: Normal range of motion and neck supple.   Skin:     General: Skin is warm.      Coloration: Skin is not jaundiced.   Neurological:      Mental Status: He is alert.      Cranial Nerves: Cranial nerve deficit present.      Motor: Weakness present.      Comments: Right upper extremity flaccid  Right lower extremity flaccid  Right facial droop  Left upper extremity 5 out of 5, left lower extremity 5 out of 5   Psychiatric:         Behavior: Behavior normal.             Right facial droop    Fluids    Intake/Output Summary (Last 24 hours) at 5/15/2023 1609  Last data filed at 5/15/2023 0100  Gross per 24 hour   Intake --   Output 550 ml   Net -550 ml       Laboratory  Recent  Labs     05/13/23 0425 05/14/23 0630 05/15/23  0228   WBC 11.5* 9.5 7.4   RBC 4.30* 4.55* 4.32*   HEMOGLOBIN 12.1* 13.0* 12.5*   HEMATOCRIT 37.2* 40.6* 38.5*   MCV 86.5 89.2 89.1   MCH 28.1 28.6 28.9   MCHC 32.5* 32.0* 32.5*   RDW 48.1 50.2* 50.4*   PLATELETCT 183 154* 160*   MPV 10.0 9.8 9.9     Recent Labs     05/13/23  0425 05/14/23  0630 05/15/23  0228   SODIUM 142 144 145   POTASSIUM 4.3 3.9 4.0   CHLORIDE 108 108 105   CO2 21 24 28   GLUCOSE 163* 159* 184*   BUN 22 27* 33*   CREATININE 1.47* 1.23 1.37   CALCIUM 8.6 9.0 9.2             Recent Labs     05/13/23 0425   TRIGLYCERIDE 129   HDL 30*   LDL 70       Imaging  EC-ECHOCARDIOGRAM COMPLETE W/ CONT   Final Result      MR-BRAIN-W/O   Final Result      1.  Old lacunar infarct right head of caudate nucleus.   2.  Old lacunar infarcts bilateral thalami.   3.  Old lacunar infarcts right frontal deep white matter along the corona radiata and right superior basal ganglia.   4.  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   5.  Multiple foci of old hypertensive microhemorrhage in both cerebral hemispheres. No acute hemorrhage.   6.  Prominent wedgelike/bandlike area of acute brainstem infarction in the left paramedian omid. No hemorrhagic transformation.   7.  Old lacunar infarct right cerebellar hemisphere.   8.  Right maxillary sinus chronic sinusitis with T2 hypointense inspissated secretions.      OUTSIDE IMAGES-DX CHEST   Final Result      OUTSIDE IMAGES-CT HEAD   Final Result      OUTSIDE IMAGES-CT HEAD   Final Result      CT-FOREIGN FILM CAT SCAN   Final Result      DX-FEMUR-2+ RIGHT   Final Result      No acute osseous abnormality.      DX-PELVIS-1 OR 2 VIEWS   Final Result      No acute osseous abnormality.      CT-HEAD W/O   Final Result      1.  Wedge-shaped hypoattenuation in the left paramedian omid  could indicate acute infarct. MRI brain could be obtained for further evaluation if clinically indicated.   2.  Mild diffuse cerebral substance loss.   3.  Moderate microangiopathic ischemic change versus demyelination or gliosis.   4.  Chronic right caudate head lacunar infarct.   5.  Chronic right maxillary sinusitis.              Assessment/Plan  * Acute cerebrovascular accident (CVA) (HCC)- (present on admission)  Assessment & Plan  Acute ischemic stroke likely occurring 5/11 in the afternoon now s/p delayed TNK administration 5/12 at 0445  Echo pending, prior dilated cardiomyopathy  Strict blood pressure management with goal SBP less than 140   PT/OT/SLP evaluation, referral to rehabilitation, inpatient rehabilitation appropriate likely  Antiplatelets and statin  Neurology consulted  Close neurologic monitoring  Aspiration precaution, seizure precaution, fall precaution    Dysarthria  Assessment & Plan  Secondary to CVA    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 (HCC)- (present on admission)  Assessment & Plan  Stroke treatment, PT OT SLP    Multiple lacunar infarcts (HCC)- (present on admission)  Assessment & Plan  As noted on MRI  Follow-up echocardiogram  Maximize medical therapy  Rule out cardiac arrhythmia    Pain of right hip- (present on admission)  Assessment & Plan  From fall yesterday in the setting of stroke  X-rays of pelvis and right femur negative  As needed analgesics  PT/OT    Type 2 diabetes mellitus without complication, without long-term current use of insulin (HCC)- (present on admission)  Assessment & Plan  With hyperglycemia, his hemoglobin A1c of 6.0 indicates good control  Continue insulin sliding scale for now, eventually resume NPH and glipizide when appropriate  Eventual diabetic diet when passes SLP monitoring glucose with goal between 120 and 180    Mixed hyperlipidemia- (present on admission)  Assessment & Plan  Continue statin, his current  LDL is at 70, arguably at goal, reduce statin dosage    Coronary artery disease involving native coronary artery of native heart without angina pectoris- (present on admission)  Assessment & Plan  Negative coronary angiogram in 2015  Await echocardiogram  Continue medical regimen    Essential hypertension, benign- (present on admission)  Assessment & Plan  Continue strict blood pressure goals with a systolic blood pressure less than 140  As needed hydralazine, labetalol, nicardipine  Resume outpatient Coreg, Lasix, and lisinopril doses    Dilated cardiomyopathy (HCC)- (present on admission)  Assessment & Plan  With improved ejection fraction in 2017 up to 50%, previously followed by cardiology  Repeat echocardiogram pending  Resume heart failure medications including Coreg, Lasix, and lisinopril today  Prior coronary angiogram in 2015 negative for low limiting stenosis  Did have a 40% LAD lesion         VTE prophylaxis: SCDs/TEDs and heparin ppx    I have performed a physical exam and reviewed and updated ROS and Plan today (5/15/2023). In review of yesterday's note (5/14/2023), there are no changes except as documented above.

## 2023-05-15 NOTE — PROGRESS NOTES
Patient's BP this morning, 99/69. Patient's BP parameters is hold for anything below 120 and 110. This RN took the patient's BP twice, second time it was 96/59. Also holding lasix, informed night APRN via voalte. Will continue to monitor patient's BP.

## 2023-05-15 NOTE — PROGRESS NOTES
Stroke neuro note    TTE results reviewed- no obvious embolic nidus, and LVEF is greater than 30%.  Secondary stroke prevention as previously documented- ASA 81mg daily, atorvastatin 80mg daily, normotensive BP goal.  Please reconsult Stroke Neurology with any additional questions or concerns.    Jarett Cody MD  Stroke Neurology

## 2023-05-15 NOTE — DISCHARGE PLANNING
Dr. Hemphill consult potential IRF program will need to verify return to community support and plan. Will follow.

## 2023-05-16 VITALS
HEART RATE: 58 BPM | WEIGHT: 183.42 LBS | OXYGEN SATURATION: 95 % | SYSTOLIC BLOOD PRESSURE: 106 MMHG | HEIGHT: 67 IN | TEMPERATURE: 97.4 F | DIASTOLIC BLOOD PRESSURE: 65 MMHG | BODY MASS INDEX: 28.79 KG/M2 | RESPIRATION RATE: 16 BRPM

## 2023-05-16 PROBLEM — M25.551 PAIN OF RIGHT HIP: Status: RESOLVED | Noted: 2023-05-12 | Resolved: 2023-05-16

## 2023-05-16 LAB
ALBUMIN SERPL BCP-MCNC: 3.8 G/DL (ref 3.2–4.9)
ANION GAP SERPL CALC-SCNC: 12 MMOL/L (ref 7–16)
BASOPHILS # BLD AUTO: 0.6 % (ref 0–1.8)
BASOPHILS # BLD: 0.04 K/UL (ref 0–0.12)
BUN SERPL-MCNC: 36 MG/DL (ref 8–22)
CALCIUM ALBUM COR SERPL-MCNC: 9.2 MG/DL (ref 8.5–10.5)
CALCIUM SERPL-MCNC: 9 MG/DL (ref 8.5–10.5)
CHLORIDE SERPL-SCNC: 108 MMOL/L (ref 96–112)
CO2 SERPL-SCNC: 24 MMOL/L (ref 20–33)
CREAT SERPL-MCNC: 1.18 MG/DL (ref 0.5–1.4)
EOSINOPHIL # BLD AUTO: 0.38 K/UL (ref 0–0.51)
EOSINOPHIL NFR BLD: 5.4 % (ref 0–6.9)
ERYTHROCYTE [DISTWIDTH] IN BLOOD BY AUTOMATED COUNT: 49.4 FL (ref 35.9–50)
GFR SERPLBLD CREATININE-BSD FMLA CKD-EPI: 69 ML/MIN/1.73 M 2
GLUCOSE BLD STRIP.AUTO-MCNC: 175 MG/DL (ref 65–99)
GLUCOSE BLD STRIP.AUTO-MCNC: 184 MG/DL (ref 65–99)
GLUCOSE SERPL-MCNC: 164 MG/DL (ref 65–99)
HCT VFR BLD AUTO: 37.9 % (ref 42–52)
HGB BLD-MCNC: 12.4 G/DL (ref 14–18)
IMM GRANULOCYTES # BLD AUTO: 0.02 K/UL (ref 0–0.11)
IMM GRANULOCYTES NFR BLD AUTO: 0.3 % (ref 0–0.9)
LYMPHOCYTES # BLD AUTO: 2.03 K/UL (ref 1–4.8)
LYMPHOCYTES NFR BLD: 28.7 % (ref 22–41)
MAGNESIUM SERPL-MCNC: 2.4 MG/DL (ref 1.5–2.5)
MCH RBC QN AUTO: 29 PG (ref 27–33)
MCHC RBC AUTO-ENTMCNC: 32.7 G/DL (ref 33.7–35.3)
MCV RBC AUTO: 88.8 FL (ref 81.4–97.8)
MONOCYTES # BLD AUTO: 0.59 K/UL (ref 0–0.85)
MONOCYTES NFR BLD AUTO: 8.3 % (ref 0–13.4)
NEUTROPHILS # BLD AUTO: 4.01 K/UL (ref 1.82–7.42)
NEUTROPHILS NFR BLD: 56.7 % (ref 44–72)
NRBC # BLD AUTO: 0 K/UL
NRBC BLD-RTO: 0 /100 WBC
PHOSPHATE SERPL-MCNC: 4.1 MG/DL (ref 2.5–4.5)
PLATELET # BLD AUTO: 152 K/UL (ref 164–446)
PMV BLD AUTO: 9.9 FL (ref 9–12.9)
POTASSIUM SERPL-SCNC: 4.2 MMOL/L (ref 3.6–5.5)
RBC # BLD AUTO: 4.27 M/UL (ref 4.7–6.1)
SODIUM SERPL-SCNC: 144 MMOL/L (ref 135–145)
WBC # BLD AUTO: 7.1 K/UL (ref 4.8–10.8)

## 2023-05-16 PROCEDURE — 99239 HOSP IP/OBS DSCHRG MGMT >30: CPT | Performed by: HOSPITALIST

## 2023-05-16 PROCEDURE — A9270 NON-COVERED ITEM OR SERVICE: HCPCS | Performed by: PSYCHIATRY & NEUROLOGY

## 2023-05-16 PROCEDURE — 700102 HCHG RX REV CODE 250 W/ 637 OVERRIDE(OP): Performed by: PSYCHIATRY & NEUROLOGY

## 2023-05-16 PROCEDURE — 80069 RENAL FUNCTION PANEL: CPT

## 2023-05-16 PROCEDURE — 700102 HCHG RX REV CODE 250 W/ 637 OVERRIDE(OP): Performed by: STUDENT IN AN ORGANIZED HEALTH CARE EDUCATION/TRAINING PROGRAM

## 2023-05-16 PROCEDURE — 82962 GLUCOSE BLOOD TEST: CPT

## 2023-05-16 PROCEDURE — 85025 COMPLETE CBC W/AUTO DIFF WBC: CPT

## 2023-05-16 PROCEDURE — 700102 HCHG RX REV CODE 250 W/ 637 OVERRIDE(OP): Performed by: INTERNAL MEDICINE

## 2023-05-16 PROCEDURE — 700102 HCHG RX REV CODE 250 W/ 637 OVERRIDE(OP): Performed by: HOSPITALIST

## 2023-05-16 PROCEDURE — A9270 NON-COVERED ITEM OR SERVICE: HCPCS | Performed by: INTERNAL MEDICINE

## 2023-05-16 PROCEDURE — 36415 COLL VENOUS BLD VENIPUNCTURE: CPT

## 2023-05-16 PROCEDURE — A9270 NON-COVERED ITEM OR SERVICE: HCPCS | Performed by: HOSPITALIST

## 2023-05-16 PROCEDURE — A9270 NON-COVERED ITEM OR SERVICE: HCPCS | Performed by: STUDENT IN AN ORGANIZED HEALTH CARE EDUCATION/TRAINING PROGRAM

## 2023-05-16 PROCEDURE — 83735 ASSAY OF MAGNESIUM: CPT

## 2023-05-16 RX ORDER — FUROSEMIDE 20 MG/1
20 TABLET ORAL 2 TIMES DAILY
Qty: 60 TABLET | Status: SHIPPED
Start: 2023-05-16

## 2023-05-16 RX ORDER — AMLODIPINE BESYLATE 5 MG/1
5 TABLET ORAL DAILY
Qty: 30 TABLET | Status: SHIPPED
Start: 2023-05-17

## 2023-05-16 RX ORDER — CARBOXYMETHYLCELLULOSE SODIUM 5 MG/ML
1 SOLUTION/ DROPS OPHTHALMIC PRN
Status: SHIPPED
Start: 2023-05-16

## 2023-05-16 RX ORDER — ATORVASTATIN CALCIUM 40 MG/1
40 TABLET, FILM COATED ORAL EVERY EVENING
Qty: 30 TABLET | Status: SHIPPED
Start: 2023-05-16

## 2023-05-16 RX ORDER — LISINOPRIL 40 MG/1
40 TABLET ORAL DAILY
Qty: 30 TABLET | Status: SHIPPED
Start: 2023-05-17

## 2023-05-16 RX ADMIN — FUROSEMIDE 20 MG: 20 TABLET ORAL at 05:16

## 2023-05-16 RX ADMIN — LISINOPRIL 40 MG: 20 TABLET ORAL at 05:16

## 2023-05-16 RX ADMIN — POLYETHYLENE GLYCOL 3350 1 PACKET: 17 POWDER, FOR SOLUTION ORAL at 14:46

## 2023-05-16 RX ADMIN — CARVEDILOL 25 MG: 6.25 TABLET, FILM COATED ORAL at 08:07

## 2023-05-16 RX ADMIN — ASPIRIN 81 MG 81 MG: 81 TABLET ORAL at 05:16

## 2023-05-16 RX ADMIN — AMLODIPINE BESYLATE 5 MG: 5 TABLET ORAL at 05:16

## 2023-05-16 ASSESSMENT — PAIN DESCRIPTION - PAIN TYPE: TYPE: ACUTE PAIN

## 2023-05-16 NOTE — DISCHARGE PLANNING
DC Transport Scheduled    Received request at: 5/16/2023 at 1030    Transport Company Scheduled:  [employer]  Spoke with Pako at [facility] to schedule transport.    Scheduled Date: 5/16/2023  Scheduled Time: 1500    Destination: [facility] at [address] [place] [address] Ahsan العراقي     Notified care team of scheduled transport via Voalte.     If there are any changes needed to the DC transportation scheduled, please contact [facility] at ext. [phone] between the hours of 0404-9027 Mon-Fri. If outside those hours, contact the ED Case Manager at ext. [phone].

## 2023-05-16 NOTE — DISCHARGE PLANNING
"Spoke with ex wife Indio , stated patient does not live with her but she is part of his \"care team\" at home . Patient lives with son Giancarlo and Giancarlo's girlfriend in a mobile home with two steps to enter.  Son does work , but girlfriend is home all day to help patient.  Advised indio of covid and visiting policies  Reviewed therapy , 3 hours/day 5 days/week.  Advised on family training.  Indio was verbally agreeable to both admission and family training. Indio will have son bring 4-5 changes of clothes for patients needs.  Left [facility] direct line for any questions or concerns that arise.  Answered all questions.         0950 Per CM patient has been accepted to [facility].  Patient to admit there, then make possible transition to [facility].   Telephoned ex wife Indio for update on patients disposition.    PREMA Rivera sent message via Voalte.  "

## 2023-05-16 NOTE — DISCHARGE PLANNING
Case Management Discharge Planning    Admission Date: 5/12/2023  GMLOS: 2.9  ALOS: 4    6-Clicks ADL Score: 11  6-Clicks Mobility Score: 6  PT and/or OT Eval ordered: Yes  Post-acute Referrals Ordered: Yes  Post-acute Choice Obtained: Yes  Has referral(s) been sent to post-acute provider:  Yes      Anticipated Discharge Dispo: Discharge Disposition: Disch to  rehab facility or distinct part unit (62)  Discharge Address: TBD    DME Needed: No    Action(s) Taken:   Voalte message sent to Selina, Clinical Admissions Coordinator @ [facility], inquiring about IRF referral status; Selina stated that she will check.   Patient accepted at [facility] ([facility]  PASRR in Manual Review.    Escalations Completed: None    Medically Clear: Yes    Next Steps: CM will f/u on IRF acceptance status    Barriers to Discharge: Pending Placement    Is the patient up for discharge tomorrow:  Medically cleared    **0908 Hrs - Per YA Marks support verified.  She will present at AM meeting and f/u with CM.    **0949 Hrs - Received Voalte message from Selina @ [facility] stating that Admin recommended for patient to go to [facility] first and transition over to [facility].    **1201 Hrs - Patient accepted for transfer to [facility] today with [facility] transport scheduled for 1500 Hrs.  CM updated patient & ex-wife, Loyda #[phone], on DCP.  IM presented to patient.    Transfer packet handed to bedside nurse, Leda.

## 2023-05-16 NOTE — PROGRESS NOTES
Monitor summary: SB/SR 53-64, ID -0.17, QRS -0.11, QT -0.55, with BBB per strip from the monitor room.

## 2023-05-16 NOTE — CARE PLAN
The patient is Stable - Low risk of patient condition declining or worsening      Progress made toward(s) clinical / shift goals:      Problem: Knowledge Deficit - Stroke Education  Goal: Patient's knowledge of stroke and risk factors will improve  Outcome: Progressing  Note: Pt verbalizes understanding of plan of care, requires occasional reminders.     Problem: Neuro Status  Goal: Neuro status will remain stable or improve  Outcome: Progressing  Note: Neuro status remains stable.

## 2023-05-16 NOTE — DISCHARGE INSTRUCTIONS
Discharge Instructions    Discharged to other by medical transportation with escort. Discharged via ambulance, hospital escort: Yes.  Special equipment needed: Not Applicable    Be sure to schedule a follow-up appointment with your primary care doctor or any specialists as instructed.     Discharge Plan:   Diet Plan: Discussed  Activity Level: Discussed  Confirmed Follow up Appointment: Appointment Scheduled  Confirmed Symptoms Management: Discussed  Medication Reconciliation Updated: Yes    I understand that a diet low in cholesterol, fat, and sodium is recommended for good health. Unless I have been given specific instructions below for another diet, I accept this instruction as my diet prescription.   Other diet: Level 4, Mildly thick liquids    Special Instructions:     Stroke/CVA/TIA/Hemorrhagic Ischemia Discharge Instructions  You have had a stroke. Your risk factors have been identified as follows:  Age - Over 55  Gender - Men are at a higher risk than women  High blood pressure  Diabetes  Overweight  It is important that you reduce your risk factors to avoid another stroke in the future. Here are some general guidelines to follow:  Eat healthy - avoid food high in fat.  Get regular exercise.  Maintain a healthy weight.  Avoid smoking.  Avoid alcohol and illegal drug use.  Take your medications as directed.  For more information regarding risk factors, refer to pages 17-19 in your Stroke Patient Education Guide. Stroke Education Guide was given to patient.    Warning signs of a stroke include (which can also be found on page 3 of your Stroke Patient Education Guide):  Sudden numbness of weakness of the face, arm or leg (especially on one side of the body).  Sudden confusion, trouble speaking or understanding.  Sudden trouble seeing in one or both eyes.  Sudden trouble walking, dizziness, loss of balance or coordination.  Sudden severe headache with no known cause.  It is very important to get treatment  quickly when a stroke occurs. If you experience any of the above warning signs, call 911 immediately.     Some patients who have had a stroke will be going home on a blood thinner medication called Warfarin (Coumadin).  This medication requires very close monitoring and follow up.  This follow up can be provided by either your Primary Care Physician or by [facility] Outpatient Anticoagulation [facility].  The Outpatient Anticoagulation Service is located at the [facility] at [facility] ([facility]  If you do not know when your follow up appointment is scheduled, call [phone] to verify your appointment time.    -Is this patient being discharged with medication to prevent blood clots?  Yes, Aspirin   Aspirin, ASA oral tablets  What is this medicine?  ASPIRIN (AS pir in) is a pain reliever. It is used to treat mild pain and fever. This medicine is also used as directed by a doctor to prevent and to treat heart attacks, to prevent strokes and blood clots, and to treat arthritis or inflammation.  This medicine may be used for other purposes; ask your health care provider or pharmacist if you have questions.  COMMON BRAND NAME(S): Aspir-Low, Aspir-Isabel, Aspirtab, Anastacio Advanced Aspirin, Anastacio Aspirin, Anastacio Aspirin Extra Strength, Anastacio Aspirin Plus, Anastacio Extra Strength, Anastacio Extra Strength Plus, Anastacio Genuine Aspirin, Anastacio Womens Aspirin, Bufferin, Bufferin Extra Strength, Bufferin Low Dose  What should I tell my health care provider before I take this medicine?  They need to know if you have any of these conditions:  anemia  asthma  bleeding problems  child with chickenpox, the flu, or other viral infection  diabetes  gout  if you frequently drink alcohol containing drinks  kidney disease  liver disease  low level of vitamin K  lupus  smoke tobacco  stomach ulcers or other problems  an unusual or allergic reaction to aspirin, tartrazine dye, other medicines,  dyes, or preservatives  pregnant or trying to get pregnant  breast-feeding  How should I use this medicine?  Take this medicine by mouth with a glass of water. Follow the directions on the package or prescription label. You can take this medicine with or without food. If it upsets your stomach, take it with food. Do not take your medicine more often than directed.  Talk to your pediatrician regarding the use of this medicine in children. While this drug may be prescribed for children as young as 12 years of age for selected conditions, precautions do apply. Children and teenagers should not use this medicine to treat chicken pox or flu symptoms unless directed by a doctor.  Patients over 65 years old may have a stronger reaction and need a smaller dose.  Overdosage: If you think you have taken too much of this medicine contact a poison control center or emergency room at once.  NOTE: This medicine is only for you. Do not share this medicine with others.  What if I miss a dose?  If you are taking this medicine on a regular schedule and miss a dose, take it as soon as you can. If it is almost time for your next dose, take only that dose. Do not take double or extra doses.  What may interact with this medicine?  Do not take this medicine with any of the following medications:  cidofovir  ketorolac  probenecid  This medicine may also interact with the following medications:  alcohol  alendronate  bismuth subsalicylate  flavocoxid  herbal supplements like feverfew, garlic, fide, ginkgo biloba, horse chestnut  medicines for diabetes or glaucoma like acetazolamide, methazolamide  medicines for gout  medicines that treat or prevent blood clots like enoxaparin, heparin, ticlopidine, warfarin  other aspirin and aspirin-like medicines  NSAIDs, medicines for pain and inflammation, like ibuprofen or naproxen  pemetrexed  sulfinpyrazone  varicella live vaccine  This list may not describe all possible interactions. Give your  health care provider a list of all the medicines, herbs, non-prescription drugs, or dietary supplements you use. Also tell them if you smoke, drink alcohol, or use illegal drugs. Some items may interact with your medicine.  What should I watch for while using this medicine?  If you are treating yourself for pain, tell your doctor or health care professional if the pain lasts more than 10 days, if it gets worse, or if there is a new or different kind of pain. Tell your doctor if you see redness or swelling. Also, check with your doctor if you have a fever that lasts for more than 3 days. Only take this medicine to prevent heart attacks or blood clotting if prescribed by your doctor or health care professional.  Do not take aspirin or aspirin-like medicines with this medicine. Too much aspirin can be dangerous. Always read the labels carefully.  This medicine can irritate your stomach or cause bleeding problems. Do not smoke cigarettes or drink alcohol while taking this medicine. Do not lie down for 30 minutes after taking this medicine to prevent irritation to your throat.  If you are scheduled for any medical or dental procedure, tell your healthcare provider that you are taking this medicine. You may need to stop taking this medicine before the procedure.  This medicine may be used to treat migraines. If you take migraine medicines for 10 or more days a month, your migraines may get worse. Keep a diary of headache days and medicine use. Contact your healthcare professional if your migraine attacks occur more frequently.  What side effects may I notice from receiving this medicine?  Side effects that you should report to your doctor or health care professional as soon as possible:  allergic reactions like skin rash, itching or hives, swelling of the face, lips, or tongue  breathing problems  changes in hearing, ringing in the ears  confusion  general ill feeling or flu-like symptoms  pain on swallowing  redness,  blistering, peeling or loosening of the skin, including inside the mouth or nose  signs and symptoms of bleeding such as bloody or black, tarry stools; red or dark-brown urine; spitting up blood or brown material that looks like coffee grounds; red spots on the skin; unusual bruising or bleeding from the eye, gums, or nose  trouble passing urine or change in the amount of urine  unusually weak or tired  yellowing of the eyes or skin  Side effects that usually do not require medical attention (report to your doctor or health care professional if they continue or are bothersome):  diarrhea or constipation  headache  nausea, vomiting  stomach gas, heartburn  This list may not describe all possible side effects. Call your doctor for medical advice about side effects. You may report side effects to FDA at 7-292-YFK-1267.  Where should I keep my medicine?  Keep out of the reach of children.  Store at room temperature between 15 and 30 degrees C (59 and 86 degrees F). Protect from heat and moisture. Do not use this medicine if it has a strong vinegar smell. Throw away any unused medicine after the expiration date.  NOTE: This sheet is a summary. It may not cover all possible information. If you have questions about this medicine, talk to your doctor, pharmacist, or health care provider.  © 2020 [employer]/Gold Standard (2018-01-30 10:42:13)    Is patient discharged on Warfarin / Coumadin?   No

## 2023-05-16 NOTE — DISCHARGE SUMMARY
Discharge Summary    CHIEF COMPLAINT ON ADMISSION  No chief complaint on file.      Reason for Admission  Acute Stroke    Admission Date  5/12/2023     CODE STATUS  Full Code    HPI & HOSPITAL COURSE  Please see original H&P and consult notes for specific information.  62 y.o. male who presented 5/12/2023 with history of diabetes type 2, hypertension, dyslipidemia, history of cardiomyopathy with unknown follow-up, possible prior cardiomyopathy, resides in [facility], was brought to [place] emergency department after being found down on the floor at approximately 3 AM in the morning, right-sided hemiplegia and aphasia, at the facility the patient had a normal head CT, negative CT angiogram, remote consultation obtained from neurology and the patient felt appropriate for TNK administration which was given at 0445 a.m., NIH stroke scale 19, the patient was initially hypertensive with a blood pressure of 180 and proven to 140, blood sugar was 137, reportedly the patient was recently diagnosed with Bell's palsy on the left, denied previous history of CVA, reportedly takes medication for blood pressure, baby aspirin, diabetic regimen and insulin NPH.  Reportedly compliant,  The patient was brought to this facility after thrombolytics for close observation, especially care neurologic consultation.  The patient was identified to have significant right-sided weakness, right upper extremity, right lower extremity, flaccid, dysarthria, right facial droop, and chart review the patient had a previous ejection fraction several years ago down to 15%, which did not subsequently improved to 50%.  The patient did have a coronary angiogram in 2015 where the patient was found to have nonobstructive coronary disease, at that time ejection fraction of 50% was diagnosed, it is possibly history of sleep apnea  No documented prior dysrhythmia as per chart review.  The patient is seen in ICU, he is stabilized and is able to  transfer out of ICU at this time.  A follow-up echocardiogram is currently pending, the MRI showed a left acute infarction of the omid.  Prior infarction noted, multiple areas.  Laboratory data indicates some leukocytosis 11.5, hemoglobin 12.1, platelet count 183, creatinine 1.47, glucose 163, LDL at 70, troponin level in the indeterminate range, a glycohemoglobin  A1c 6.0  Repeat echo showed EF 45%, patient known to have heart failure, per neurology recommending continue aspirin 81 mg daily and statin, needs follow-up with neurology as outpatient.  Patient complains of right hip pain x-rays were negative, for his type 2 diabetes he will continue on sliding scale insulin, continue Coreg Lasix and statin and lisinopril,follow-up with cardiology as outpatient.  [facility] patient will be transferring continue physical rehabilitation, patient needs to follow-up with:   Primary care physician  Neurology  Cardiology.      Therefore, he is discharged in good and stable condition to skilled nursing facility.    The patient met 2-midnight criteria for an inpatient stay at the time of discharge.      FOLLOW UP ITEMS POST DISCHARGE  Primary care physician  Cardiology  Neurology    Needs repeat CBC CMP in 1 week.    DISCHARGE DIAGNOSES  Principal Problem:    Acute cerebrovascular accident (CVA) (HCC) (POA: Yes)  Active Problems:    Dilated cardiomyopathy (HCC) (POA: Yes)      Overview: April 2015: Echocardiogram with severely dilated LV. LVEF 20-25%. Global       hypokinesis. Mild MR. Coronary angiogram with 40% stenosis of LAD, LVEF       15%. 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  [address]  [place] [address]  [phone]          MEDICATIONS ON DISCHARGE     Medication List        START taking these medications        Instructions   amLODIPine 5 MG Tabs  Start taking on: May 17, 2023  Commonly known as: NORVASC   Take 1 Tablet by mouth every day.  Dose: 5 mg     atorvastatin 40 MG Tabs  Commonly known as: LIPITOR   Take 1 Tablet by mouth every evening.  Dose: 40 mg     carboxymethylcellulose 0.5 % Soln  Commonly known as: REFRESH TEARS   Administer 1 Drop into both eyes as needed (dry eyes).  Dose: 1 Drop     insulin regular 100 Unit/mL Soln  Commonly known as: HumuLIN R   Inject 1-6 Units under the skin 4 Times a Day,Before Meals and at Bedtime.  Dose: 1-6 Units            CHANGE how you take these medications        Instructions   furosemide 20 MG Tabs  What changed:   medication strength  how much to take  when to take this  Commonly known as: LASIX   Take 1 Tablet by mouth 2 times a day.  Dose: 20 mg     lisinopril 40 MG tablet  Start taking on: May 17, 2023  What changed:   medication strength  how much to take  Commonly known as: PRINIVIL   Take 1 Tablet by mouth every day.  Dose: 40 mg            CONTINUE taking these medications        Instructions   aspirin 81 MG Chew chewable tablet  Commonly known as: ASA   Take 81 mg by mouth every day.  Dose: 81 mg     carvedilol 25 MG Tabs  Commonly known as: COREG   Take 1 Tab by mouth 2 Times a Day.  Dose: 25 mg            STOP taking these medications      glipiZIDE 10 MG Tabs  Commonly known as: GLUCOTROL     NovoLIN N  ReliOn 100 UNIT/ML Susp  Generic drug: insulin NPH              Allergies  Allergies   Allergen Reactions    Codeine Rash     Patient states he gets rash on his arm      Keflex Itching     Patient states he begins to itch all over     Statins [Hmg-Coa-R Inhibitors] Myalgia       DIET  Orders Placed This Encounter   Procedures    Diet Order Diet: Level 4 - Pureed; Liquid level: Level 2 - Mildly Thick; Tray Modifications (optional): No Straws, SLP - 1:1 Supervision by Nursing     Standing Status:   Standing     Number of Occurrences:   1     Order Specific Question:   Diet:     Answer:   Level 4 - Pureed [25]     Order Specific Question:   Liquid level     Answer:   Level 2 - Mildly Thick     Order Specific Question:   Tray Modifications (optional)     Answer:   No Straws     Order Specific Question:   Tray Modifications (optional)     Answer:   SLP - 1:1 Supervision by Nursing       ACTIVITY  As tolerated.  Weight bearing as tolerated    LINES, DRAINS, AND WOUNDS  This is an automated list. Peripheral IVs will be removed prior to discharge.  Peripheral IV 20 G Posterior;Right Hand (Active)   Site Assessment Clean;Dry;Intact 05/16/23 0800   Dressing Type Transparent 05/16/23 0800   Line Status Flushed;Scrubbed the hub prior to access;Saline locked 05/16/23 0800   Dressing Status Dry;Clean;Intact 05/16/23 0800   Dressing Intervention N/A 05/16/23 0800   Dressing Change Due 05/20/23 05/15/23 2000   Infiltration Grading ([facility], [facility] [place]) 0 05/16/23 0800   Phlebitis Scale ([facility] Only) 0 05/16/23 0800       Peripheral IV 05/14/23 20 G Anterior;Right Forearm (Active)   Site Assessment Clean;Dry;Intact 05/16/23 0800   Dressing Type Transparent 05/16/23 0800   Line Status Flushed;Scrubbed the hub prior to access;Saline locked 05/16/23 0800   Dressing Status Clean;Dry;Intact 05/16/23 0800   Dressing Intervention N/A 05/16/23 0800   Dressing Change Due 05/20/23 05/15/23 2000   [facility] 0 05/16/23 0800    Phlebitis Scale ([facility] Only) 0 05/16/23 0800          Peripheral IV 20 G Posterior;Right Hand (Active)   Site Assessment Clean;Dry;Intact 05/16/23 0800   Dressing Type Transparent 05/16/23 0800   Line Status Flushed;Scrubbed the hub prior to access;Saline locked 05/16/23 0800   Dressing Status Dry;Clean;Intact 05/16/23 0800   Dressing Intervention N/A 05/16/23 0800   Dressing Change Due 05/20/23 05/15/23 2000   [place] [facility] 0 05/16/23 0800   Phlebitis Scale ([facility] Only) 0 05/16/23 0800       Peripheral IV 05/14/23 20 G Anterior;Right Forearm (Active)   Site Assessment Clean;Dry;Intact 05/16/23 0800   Dressing Type Transparent 05/16/23 0800   Line Status Flushed;Scrubbed the hub prior to access;Saline locked 05/16/23 0800   Dressing Status Clean;Dry;Intact 05/16/23 0800   Dressing Intervention N/A 05/16/23 0800   Dressing Change Due 05/20/23 05/15/23 2000   [place]fil[place] Grading [place] [facility] 0 05/16/23 0800   Phlebitis Scale ([facility] Only) 0 05/16/23 0800               MENTAL STATUS ON TRANSFER             CONSULTATIONS  Neurology  Critical care    PROCEDURES  None    LABORATORY  Lab Results   Component Value Date    SODIUM 144 05/16/2023    POTASSIUM 4.2 05/16/2023    CHLORIDE 108 05/16/2023    CO2 24 05/16/2023    GLUCOSE 164 (H) 05/16/2023    BUN 36 (H) 05/16/2023    CREATININE 1.18 05/16/2023        Lab Results   Component Value Date    WBC 7.1 05/16/2023    HEMOGLOBIN 12.4 (L) 05/16/2023    HEMATOCRIT 37.9 (L) 05/16/2023    PLATELETCT 152 (L) 05/16/2023        Total time of the discharge process exceeds 35 minutes.

## 2023-05-16 NOTE — DISCHARGE PLANNING
Agency/Facility Name: [facility]  Spoke To: Sis  Outcome: DPA called facility to ask for bed availability. Per Sis, pt has a bed today  DPA to call facility back with confirmed transport time     RN CM notified       [id number]    Agency/Facility Name: [facility]  Spoke To: Juan  Outcome: DPA called and gave facility confirmed transport time

## 2023-05-16 NOTE — CARE PLAN
The patient is Stable - Low risk of patient condition declining or worsening    Shift Goals  Clinical Goals: neuro monitoring  Patient Goals: bowel movement  Family Goals: Bed bath    Progress made toward(s) clinical / shift goals:    Problem: Knowledge Deficit - Stroke Education  Goal: Patient's knowledge of stroke and risk factors will improve  Outcome: Progressing  Note: Patient educated on stroke disease process and relevant risk factors such as HTN and DM and how to mitigate those risk factors. Stroke booklet at bedside.      Problem: Neuro Status  Goal: Neuro status will remain stable or improve  Outcome: Progressing  Note: Q 4 neuro checks and NIH scale in use. Patient A&O x 4, numbness present in the RLE. NIH of 14.      Problem: Hemodynamic Monitoring  Goal: Patient's hemodynamics, fluid balance and neurologic status will be stable or improve  Outcome: Progressing  Note: Systolic BP goal < 150. PRN hydralazine available.      Problem: Dysphagia  Goal: Dysphagia will improve  Outcome: Progressing  Note: Patient on CHO pureed and mildly thick diet.      Problem: Skin Integrity  Goal: Skin integrity is maintained or improved  Outcome: Progressing  Note: Q 2 turns in place. TAPS system and low airloss mattress in use.        Patient is not progressing towards the following goals: N/A

## 2023-05-16 NOTE — DISCHARGE PLANNING
PASRR initiated; Currently in Manual Review.    **0930 Hrs - Clinical info requested by [facility] for PASRR & submitted via portal.    **1013 Hrs - PASRR completed; PASRR [id number]    No LOC completed; Per EVS in Media, patient has no [employer] health benefit coverage.

## 2023-10-19 NOTE — PROGRESS NOTES
EKG Order for Dr. Arnold's signature   Received: Today   Message Contents   Anna Shah R.N.               Please put an EKG order in for above patient for Dr. Arnold's signature asap.  Thanks, Anna Nicolas placed   Information: Selecting Yes will display possible errors in your note based on the variables you have selected. This validation is only offered as a suggestion for you. PLEASE NOTE THAT THE VALIDATION TEXT WILL BE REMOVED WHEN YOU FINALIZE YOUR NOTE. IF YOU WANT TO FAX A PRELIMINARY NOTE YOU WILL NEED TO TOGGLE THIS TO 'NO' IF YOU DO NOT WANT IT IN YOUR FAXED NOTE.